# Patient Record
Sex: FEMALE | Race: WHITE | NOT HISPANIC OR LATINO | Employment: FULL TIME | URBAN - METROPOLITAN AREA
[De-identification: names, ages, dates, MRNs, and addresses within clinical notes are randomized per-mention and may not be internally consistent; named-entity substitution may affect disease eponyms.]

---

## 2016-11-17 LAB
EXTERNAL ANTIBODY SCREEN: NEGATIVE
EXTERNAL CHLAMYDIA SCREEN: NEGATIVE
EXTERNAL GROUP B STREP ANTIGEN: NEGATIVE
EXTERNAL HEMATOCRIT: 41 %
EXTERNAL HEMOGLOBIN: 13.7 G/DL
EXTERNAL HEPATITIS B SURFACE ANTIGEN: NEGATIVE
EXTERNAL RUBELLA QUALITATIVE: NORMAL
EXTERNAL SYPHILIS RPR SCREEN: NORMAL
EXTERNAL SYPHILIS RPR SCREEN: NORMAL

## 2017-01-04 ENCOUNTER — OFFICE VISIT (OUTPATIENT)
Dept: OBSTETRICS AND GYNECOLOGY | Facility: HOSPITAL | Age: 37
End: 2017-01-04

## 2017-01-04 ENCOUNTER — HOSPITAL ENCOUNTER (OUTPATIENT)
Dept: WOMENS IMAGING | Facility: HOSPITAL | Age: 37
Discharge: HOME | End: 2017-01-04
Attending: OBSTETRICS & GYNECOLOGY | Admitting: OBSTETRICS & GYNECOLOGY

## 2017-01-04 VITALS — BODY MASS INDEX: 30.23 KG/M2 | DIASTOLIC BLOOD PRESSURE: 70 MMHG | WEIGHT: 193 LBS | SYSTOLIC BLOOD PRESSURE: 123 MMHG

## 2017-01-04 DIAGNOSIS — O09.522 AMA (ADVANCED MATERNAL AGE) MULTIGRAVIDA 35+, SECOND TRIMESTER: Primary | ICD-10-CM

## 2017-01-04 DIAGNOSIS — O35.BXX0: ICD-10-CM

## 2017-01-04 DIAGNOSIS — O09.812 PREGNANCY RESULTING FROM IN VITRO FERTILIZATION IN SECOND TRIMESTER: ICD-10-CM

## 2017-01-04 PROBLEM — O09.529 AMA (ADVANCED MATERNAL AGE) MULTIGRAVIDA 35+: Status: ACTIVE | Noted: 2017-01-04

## 2017-01-04 PROCEDURE — 93325 DOPPLER ECHO COLOR FLOW MAPG: CPT | Performed by: OBSTETRICS & GYNECOLOGY

## 2017-01-04 PROCEDURE — 93325 DOPPLER ECHO COLOR FLOW MAPG: CPT

## 2017-01-04 PROCEDURE — 76811 OB US DETAILED SNGL FETUS: CPT

## 2017-01-04 PROCEDURE — 76825 ECHO EXAM OF FETAL HEART: CPT | Performed by: OBSTETRICS & GYNECOLOGY

## 2017-01-04 PROCEDURE — 76811 OB US DETAILED SNGL FETUS: CPT | Performed by: OBSTETRICS & GYNECOLOGY

## 2017-01-04 PROCEDURE — 76827 ECHO EXAM OF FETAL HEART: CPT

## 2017-01-04 PROCEDURE — 76825 ECHO EXAM OF FETAL HEART: CPT

## 2017-01-04 RX ORDER — PROMETHAZINE HYDROCHLORIDE 25 MG/1
12.5 TABLET ORAL EVERY 6 HOURS PRN
COMMUNITY
End: 2017-04-24 | Stop reason: HOSPADM

## 2017-01-04 RX ORDER — PRENATAL WITH FERROUS FUM AND FOLIC ACID 3080; 920; 120; 400; 22; 1.84; 3; 20; 10; 1; 12; 200; 27; 25; 2 [IU]/1; [IU]/1; MG/1; [IU]/1; MG/1; MG/1; MG/1; MG/1; MG/1; MG/1; UG/1; MG/1; MG/1; MG/1; MG/1
1 TABLET ORAL DAILY
COMMUNITY
End: 2018-10-22

## 2017-01-04 RX ORDER — PSEUDOEPHEDRINE HCL 30 MG
30 TABLET ORAL EVERY 4 HOURS PRN
COMMUNITY
End: 2017-04-24 | Stop reason: HOSPADM

## 2017-01-04 NOTE — MR AVS SNAPSHOT
Jennifer Cosme   2017 12:15 PM   Office Visit    Dept Phone:  [phone]   Encounter #:  [id number]    Provider:  RIK PIERCE DEPT SCHEDULE   Department:  [facility]                Your Full Care Plan              Your Updated Medication List          This list is accurate as of: 17  2:00 PM.  Always use your most recent med list.                amoxicillin 875 MG tablet   Commonly known as:  AMOXIL   Take 1 tablet by mouth 2 (Two) Times a Day.       omeprazole 20 MG capsule   Commonly known as:  priLOSEC       Prenatal 27-1 27-1 MG tablet tablet       promethazine 25 MG tablet   Commonly known as:  PHENERGAN       pseudoephedrine 30 MG tablet   Commonly known as:  SUDAFED               Instructions     None    Patient Instructions History      Upcoming Appointments     Visit Type Date Time Department    [facility] 2017 12[facility]    US [facility] PDC 2017 12:30 PM RIK JEAN US PER DIAG CTR      MyChart Signup     [facility] Tail allows you to send messages to your doctor, view your test results, renew your prescriptions, schedule appointments, and more. To sign up, go to [employer] and click on the Sign Up Now link in the New User? box. Enter your Tail Activation Code exactly as it appears below along with the last four digits of your Social Security Number and your Date of Birth () to complete the sign-up process. If you do not sign up before the expiration date, you must request a new code.    Tail Activation Code: [email or web address]  Expires: 2017  2:00 PM    If you have questions, you can email [email or web address] or call [phone] to talk to our Tail staff. Remember, Tail is NOT to be used for urgent needs. For medical emergencies, dial 911.               Other Info from Your Visit           Allergies     No Known Allergies      Reason for Visit     Pregnancy Problem fetal echo, IVF  pregnancy, prev c/s x 1      Vital Signs     Blood Pressure Weight Last Menstrual Period Body Mass Index Smoking Status       123/70 193 lb (87.5 kg) 07/22/2016 30.23 kg/m2 Never Smoker

## 2017-03-29 ENCOUNTER — LAB REQUISITION (OUTPATIENT)
Dept: LAB | Facility: HOSPITAL | Age: 37
End: 2017-03-29

## 2017-03-29 DIAGNOSIS — Z34.83 ENCOUNTER FOR SUPERVISION OF OTHER NORMAL PREGNANCY, THIRD TRIMESTER: ICD-10-CM

## 2017-03-29 PROCEDURE — 87081 CULTURE SCREEN ONLY: CPT | Performed by: OBSTETRICS & GYNECOLOGY

## 2017-04-01 LAB — BACTERIA SPEC AEROBE CULT: NORMAL

## 2017-04-19 DIAGNOSIS — Z3A.39 39 WEEKS GESTATION OF PREGNANCY: Primary | ICD-10-CM

## 2017-04-19 RX ORDER — CEFAZOLIN SODIUM 2 G/100ML
2 INJECTION, SOLUTION INTRAVENOUS ONCE
Status: CANCELLED | OUTPATIENT
Start: 2017-04-19 | End: 2017-04-19

## 2017-04-19 RX ORDER — SODIUM CHLORIDE 0.9 % (FLUSH) 0.9 %
1-10 SYRINGE (ML) INJECTION AS NEEDED
Status: CANCELLED | OUTPATIENT
Start: 2017-04-19

## 2017-04-20 ENCOUNTER — APPOINTMENT (OUTPATIENT)
Dept: PREADMISSION TESTING | Facility: HOSPITAL | Age: 37
End: 2017-04-20

## 2017-04-20 VITALS — WEIGHT: 203.8 LBS | HEIGHT: 67 IN | BODY MASS INDEX: 31.99 KG/M2

## 2017-04-20 LAB
ABO GROUP BLD: NORMAL
ALP SERPL-CCNC: 194 U/L (ref 25–100)
ALT SERPL W P-5'-P-CCNC: 18 U/L (ref 7–40)
ANTI-D, PASSIVE: NORMAL
AST SERPL-CCNC: 19 U/L (ref 0–33)
BILIRUB SERPL-MCNC: 0.4 MG/DL (ref 0.3–1.2)
BLD GP AB SCN SERPL QL: POSITIVE
CREAT BLD-MCNC: 0.5 MG/DL (ref 0.6–1.3)
DEPRECATED RDW RBC AUTO: 43.8 FL (ref 37–54)
ERYTHROCYTE [DISTWIDTH] IN BLOOD BY AUTOMATED COUNT: 13.9 % (ref 11.3–14.5)
EXTERNAL ABO GROUPING: NORMAL
EXTERNAL ANTIBODY SCREEN: POSITIVE
EXTERNAL RH FACTOR: NEGATIVE
HCT VFR BLD AUTO: 34.8 % (ref 34.5–44)
HGB BLD-MCNC: 11.1 G/DL (ref 11.5–15.5)
LDH SERPL-CCNC: 173 U/L (ref 120–246)
MCH RBC QN AUTO: 27.5 PG (ref 27–31)
MCHC RBC AUTO-ENTMCNC: 31.9 G/DL (ref 32–36)
MCV RBC AUTO: 86.1 FL (ref 80–99)
PLATELET # BLD AUTO: 200 10*3/MM3 (ref 150–450)
PMV BLD AUTO: 12 FL (ref 6–12)
RBC # BLD AUTO: 4.04 10*6/MM3 (ref 3.89–5.14)
RH BLD: NEGATIVE
URATE SERPL-MCNC: 3.9 MG/DL (ref 3.1–7.8)
WBC NRBC COR # BLD: 8.76 10*3/MM3 (ref 3.5–10.8)

## 2017-04-20 RX ORDER — LANSOPRAZOLE 30 MG/1
30 CAPSULE, DELAYED RELEASE ORAL DAILY
COMMUNITY
End: 2017-04-24 | Stop reason: HOSPADM

## 2017-04-20 NOTE — DISCHARGE INSTRUCTIONS
What to know before your arrive:     -Do not eat, drink or chew gum after midnight the day before your procedure.    This also includes mints.   -Do not shave any part of your body including abdomen or pelvic are for two    days before your procedure.   -If you are taking a scheduled medication (insulin, blood pressure medicine,   antibiotics) please consult with your physician whether to take on the day of   surgery.   -Remove all jewelry including rings, wedding bands, and piercing before coming   to the hospital.   -Leave important valuables at home.   -Do not wear dark fingernail polish.   -Bring the following with you to the hospital:    -Picture ID and insurance, Medicare or Medicaid cards    -Co-pay/deductible required by insurance (Cash, Check, Credit Card)    -Copy of living will or power  document (if applicable)    -CPAP mask and tubing, not machine (if applicable)    -Skin prep instructions sheet    What to know the day of procedure:     -Park in the [facility], take elevator for first floor, exit to the right and  proceed through the doors to outside, follow the covered sidewalk to the  entrance of the [place], follow the hallway and signs to the [facility],  enter the [place] to your right BEFORE entering the 1720 lobby.  Take the  elevators to the 3rd floor ([address] [place]).   -Leave unnecessary items in your vehicle, including your suitcase.  Your support  person or a family member can get it for you after your procedure.   -Check in at the reception desk in the lobby of the 3rd floor ([address] [place]).   -One person may accompany you to the pre-op/recovery area.  Please have  other family members wait in the waiting room.   -An anesthesiologist will meet with your prior to your procedure.   -After anesthesia has been initiated, one person may accompany you in the  operating room.   -No video cameras are permitted in the operating room; only still cameras,  Please.      What to  expect while you are in recovery:     -One person may stay with you while you are in recovery.   -If the baby is stable, he/she may visit to initiate breastfeeding & [facility].    THE FOLLOWING INFORMATION WAS PROVIDED TO PATIENT:     SECTION BOOKLET BY DONNA  PAIN MANAGEMENT   RESPIREX    CHLORHEXIDINE GLUCONATE WIPES AND INSTRUCTIONS GIVEN TO PATIENT

## 2017-04-21 ENCOUNTER — HOSPITAL ENCOUNTER (INPATIENT)
Facility: HOSPITAL | Age: 37
LOS: 3 days | Discharge: HOME | End: 2017-04-24
Attending: OBSTETRICS & GYNECOLOGY | Admitting: OBSTETRICS & GYNECOLOGY

## 2017-04-21 ENCOUNTER — ANESTHESIA (OUTPATIENT)
Dept: LABOR AND DELIVERY | Facility: HOSPITAL | Age: 37
End: 2017-04-21

## 2017-04-21 ENCOUNTER — ANESTHESIA EVENT (OUTPATIENT)
Dept: LABOR AND DELIVERY | Facility: HOSPITAL | Age: 37
End: 2017-04-21

## 2017-04-21 DIAGNOSIS — Z3A.39 39 WEEKS GESTATION OF PREGNANCY: ICD-10-CM

## 2017-04-21 PROBLEM — Z98.891 STATUS POST REPEAT LOW TRANSVERSE CESAREAN SECTION: Status: ACTIVE | Noted: 2017-04-21

## 2017-04-21 PROBLEM — Z34.90 PREGNANCY: Status: RESOLVED | Noted: 2017-04-21 | Resolved: 2017-04-21

## 2017-04-21 PROBLEM — Z34.90 PREGNANCY: Status: ACTIVE | Noted: 2017-04-21

## 2017-04-21 LAB
ABO GROUP BLD: NORMAL
BASOPHILS # BLD MANUAL: 0 10*3/MM3 (ref 0–0.2)
BASOPHILS NFR BLD AUTO: 0 % (ref 0–1)
DEPRECATED RDW RBC AUTO: 44.8 FL (ref 37–54)
EOSINOPHIL # BLD MANUAL: 0 10*3/MM3 (ref 0.1–0.3)
EOSINOPHIL NFR BLD MANUAL: 0 % (ref 0–3)
ERYTHROCYTE [DISTWIDTH] IN BLOOD BY AUTOMATED COUNT: 14.1 % (ref 11.3–14.5)
FETAL BLEED: POSITIVE
HCT VFR BLD AUTO: 33.1 % (ref 34.5–44)
HGB BLD-MCNC: 10.4 G/DL (ref 11.5–15.5)
HGB F BLD QL KLEIH BETKE: NORMAL
LYMPHOCYTES # BLD MANUAL: 0.9 10*3/MM3 (ref 0.6–4.8)
LYMPHOCYTES NFR BLD MANUAL: 4 % (ref 0–12)
LYMPHOCYTES NFR BLD MANUAL: 6 % (ref 24–44)
MCH RBC QN AUTO: 27.6 PG (ref 27–31)
MCHC RBC AUTO-ENTMCNC: 31.4 G/DL (ref 32–36)
MCV RBC AUTO: 87.8 FL (ref 80–99)
MONOCYTES # BLD AUTO: 0.6 10*3/MM3 (ref 0–1)
NEUTROPHILS # BLD AUTO: 13.51 10*3/MM3 (ref 1.5–8.3)
NEUTROPHILS NFR BLD MANUAL: 87 % (ref 41–71)
NEUTS BAND NFR BLD MANUAL: 3 % (ref 0–5)
PLAT MORPH BLD: NORMAL
PLATELET # BLD AUTO: 188 10*3/MM3 (ref 150–450)
PMV BLD AUTO: 11.2 FL (ref 6–12)
RBC # BLD AUTO: 3.77 10*6/MM3 (ref 3.89–5.14)
RBC MORPH BLD: NORMAL
RH BLD: NEGATIVE
WBC MORPH BLD: NORMAL
WBC NRBC COR # BLD: 15.01 10*3/MM3 (ref 3.5–10.8)

## 2017-04-21 PROCEDURE — 59025 FETAL NON-STRESS TEST: CPT

## 2017-04-21 PROCEDURE — 25010000002 FENTANYL CITRATE (PF) 100 MCG/2ML SOLUTION: Performed by: NURSE ANESTHETIST, CERTIFIED REGISTERED

## 2017-04-21 PROCEDURE — 25010000002 PHENYLEPHRINE PER 1 ML: Performed by: NURSE ANESTHETIST, CERTIFIED REGISTERED

## 2017-04-21 PROCEDURE — C1765 ADHESION BARRIER: HCPCS | Performed by: OBSTETRICS & GYNECOLOGY

## 2017-04-21 PROCEDURE — 25010000003 CEFAZOLIN IN DEXTROSE 2-4 GM/100ML-% SOLUTION: Performed by: NURSE PRACTITIONER

## 2017-04-21 PROCEDURE — 25010000002 ONDANSETRON PER 1 MG: Performed by: NURSE ANESTHETIST, CERTIFIED REGISTERED

## 2017-04-21 PROCEDURE — 25010000003 MORPHINE PER 10 MG: Performed by: NURSE ANESTHETIST, CERTIFIED REGISTERED

## 2017-04-21 PROCEDURE — 86901 BLOOD TYPING SEROLOGIC RH(D): CPT | Performed by: OBSTETRICS & GYNECOLOGY

## 2017-04-21 PROCEDURE — 85461 HEMOGLOBIN FETAL: CPT | Performed by: OBSTETRICS & GYNECOLOGY

## 2017-04-21 PROCEDURE — 85460 HEMOGLOBIN FETAL: CPT | Performed by: OBSTETRICS & GYNECOLOGY

## 2017-04-21 PROCEDURE — 25010000002 MIDAZOLAM PER 1 MG: Performed by: NURSE ANESTHETIST, CERTIFIED REGISTERED

## 2017-04-21 PROCEDURE — 86900 BLOOD TYPING SEROLOGIC ABO: CPT | Performed by: OBSTETRICS & GYNECOLOGY

## 2017-04-21 PROCEDURE — 85027 COMPLETE CBC AUTOMATED: CPT | Performed by: OBSTETRICS & GYNECOLOGY

## 2017-04-21 PROCEDURE — 85007 BL SMEAR W/DIFF WBC COUNT: CPT | Performed by: OBSTETRICS & GYNECOLOGY

## 2017-04-21 RX ORDER — FAMOTIDINE 10 MG/ML
INJECTION, SOLUTION INTRAVENOUS AS NEEDED
Status: DISCONTINUED | OUTPATIENT
Start: 2017-04-21 | End: 2017-04-21 | Stop reason: SURG

## 2017-04-21 RX ORDER — SODIUM CHLORIDE 0.9 % (FLUSH) 0.9 %
1-10 SYRINGE (ML) INJECTION AS NEEDED
Status: DISCONTINUED | OUTPATIENT
Start: 2017-04-21 | End: 2017-04-24 | Stop reason: HOSPADM

## 2017-04-21 RX ORDER — SODIUM CHLORIDE 0.9 % (FLUSH) 0.9 %
1-10 SYRINGE (ML) INJECTION AS NEEDED
Status: DISCONTINUED | OUTPATIENT
Start: 2017-04-21 | End: 2017-04-21 | Stop reason: HOSPADM

## 2017-04-21 RX ORDER — OXYTOCIN 10 [USP'U]/ML
INJECTION, SOLUTION INTRAMUSCULAR; INTRAVENOUS AS NEEDED
Status: DISCONTINUED | OUTPATIENT
Start: 2017-04-21 | End: 2017-04-21 | Stop reason: SURG

## 2017-04-21 RX ORDER — PROMETHAZINE HYDROCHLORIDE 25 MG/ML
12.5 INJECTION, SOLUTION INTRAMUSCULAR; INTRAVENOUS EVERY 6 HOURS PRN
Status: DISCONTINUED | OUTPATIENT
Start: 2017-04-21 | End: 2017-04-24 | Stop reason: HOSPADM

## 2017-04-21 RX ORDER — DIPHENHYDRAMINE HYDROCHLORIDE 50 MG/ML
25 INJECTION INTRAMUSCULAR; INTRAVENOUS EVERY 4 HOURS PRN
Status: DISCONTINUED | OUTPATIENT
Start: 2017-04-21 | End: 2017-04-22

## 2017-04-21 RX ORDER — SODIUM CHLORIDE, SODIUM LACTATE, POTASSIUM CHLORIDE, CALCIUM CHLORIDE 600; 310; 30; 20 MG/100ML; MG/100ML; MG/100ML; MG/100ML
125 INJECTION, SOLUTION INTRAVENOUS CONTINUOUS
Status: DISCONTINUED | OUTPATIENT
Start: 2017-04-21 | End: 2017-04-21 | Stop reason: SDUPTHER

## 2017-04-21 RX ORDER — SODIUM CHLORIDE, SODIUM LACTATE, POTASSIUM CHLORIDE, CALCIUM CHLORIDE 600; 310; 30; 20 MG/100ML; MG/100ML; MG/100ML; MG/100ML
125 INJECTION, SOLUTION INTRAVENOUS CONTINUOUS
Status: DISCONTINUED | OUTPATIENT
Start: 2017-04-21 | End: 2017-04-24 | Stop reason: HOSPADM

## 2017-04-21 RX ORDER — FENTANYL CITRATE 50 UG/ML
INJECTION, SOLUTION INTRAMUSCULAR; INTRAVENOUS AS NEEDED
Status: DISCONTINUED | OUTPATIENT
Start: 2017-04-21 | End: 2017-04-21 | Stop reason: SURG

## 2017-04-21 RX ORDER — DOCUSATE SODIUM 100 MG/1
100 CAPSULE, LIQUID FILLED ORAL 2 TIMES DAILY PRN
Status: DISCONTINUED | OUTPATIENT
Start: 2017-04-21 | End: 2017-04-24 | Stop reason: HOSPADM

## 2017-04-21 RX ORDER — ONDANSETRON 2 MG/ML
INJECTION INTRAMUSCULAR; INTRAVENOUS AS NEEDED
Status: DISCONTINUED | OUTPATIENT
Start: 2017-04-21 | End: 2017-04-21 | Stop reason: SURG

## 2017-04-21 RX ORDER — OXYCODONE HYDROCHLORIDE AND ACETAMINOPHEN 5; 325 MG/1; MG/1
1 TABLET ORAL EVERY 4 HOURS PRN
Status: DISCONTINUED | OUTPATIENT
Start: 2017-04-21 | End: 2017-04-24 | Stop reason: HOSPADM

## 2017-04-21 RX ORDER — MORPHINE SULFATE 4 MG/ML
2 INJECTION, SOLUTION INTRAMUSCULAR; INTRAVENOUS
Status: CANCELLED | OUTPATIENT
Start: 2017-04-21 | End: 2017-04-21

## 2017-04-21 RX ORDER — CEFAZOLIN SODIUM 2 G/100ML
2 INJECTION, SOLUTION INTRAVENOUS ONCE
Status: COMPLETED | OUTPATIENT
Start: 2017-04-21 | End: 2017-04-21

## 2017-04-21 RX ORDER — DIPHENHYDRAMINE HCL 25 MG
25 CAPSULE ORAL EVERY 4 HOURS PRN
Status: DISCONTINUED | OUTPATIENT
Start: 2017-04-21 | End: 2017-04-22

## 2017-04-21 RX ORDER — SIMETHICONE 80 MG
80 TABLET,CHEWABLE ORAL
Status: DISCONTINUED | OUTPATIENT
Start: 2017-04-21 | End: 2017-04-24 | Stop reason: HOSPADM

## 2017-04-21 RX ORDER — IBUPROFEN 600 MG/1
600 TABLET ORAL EVERY 6 HOURS PRN
Status: DISCONTINUED | OUTPATIENT
Start: 2017-04-21 | End: 2017-04-24 | Stop reason: HOSPADM

## 2017-04-21 RX ORDER — LANOLIN 100 %
OINTMENT (GRAM) TOPICAL
Status: DISCONTINUED | OUTPATIENT
Start: 2017-04-21 | End: 2017-04-24 | Stop reason: HOSPADM

## 2017-04-21 RX ORDER — PROMETHAZINE HYDROCHLORIDE 12.5 MG/1
12.5 SUPPOSITORY RECTAL EVERY 6 HOURS PRN
Status: DISCONTINUED | OUTPATIENT
Start: 2017-04-21 | End: 2017-04-24 | Stop reason: HOSPADM

## 2017-04-21 RX ORDER — ONDANSETRON 2 MG/ML
4 INJECTION INTRAMUSCULAR; INTRAVENOUS ONCE AS NEEDED
Status: DISCONTINUED | OUTPATIENT
Start: 2017-04-21 | End: 2017-04-22

## 2017-04-21 RX ORDER — NALOXONE HCL 0.4 MG/ML
0.4 VIAL (ML) INJECTION
Status: ACTIVE | OUTPATIENT
Start: 2017-04-21 | End: 2017-04-22

## 2017-04-21 RX ORDER — MORPHINE SULFATE 2 MG/ML
2 INJECTION, SOLUTION INTRAMUSCULAR; INTRAVENOUS
Status: DISCONTINUED | OUTPATIENT
Start: 2017-04-21 | End: 2017-04-22

## 2017-04-21 RX ORDER — HYDROCODONE BITARTRATE AND ACETAMINOPHEN 5; 325 MG/1; MG/1
1 TABLET ORAL EVERY 4 HOURS PRN
Status: DISCONTINUED | OUTPATIENT
Start: 2017-04-21 | End: 2017-04-24 | Stop reason: HOSPADM

## 2017-04-21 RX ORDER — MIDAZOLAM HYDROCHLORIDE 1 MG/ML
INJECTION INTRAMUSCULAR; INTRAVENOUS AS NEEDED
Status: DISCONTINUED | OUTPATIENT
Start: 2017-04-21 | End: 2017-04-21 | Stop reason: SURG

## 2017-04-21 RX ORDER — BUPIVACAINE HYDROCHLORIDE 7.5 MG/ML
INJECTION, SOLUTION EPIDURAL; RETROBULBAR AS NEEDED
Status: DISCONTINUED | OUTPATIENT
Start: 2017-04-21 | End: 2017-04-21 | Stop reason: SURG

## 2017-04-21 RX ORDER — MORPHINE SULFATE 0.5 MG/ML
INJECTION, SOLUTION EPIDURAL; INTRATHECAL; INTRAVENOUS AS NEEDED
Status: DISCONTINUED | OUTPATIENT
Start: 2017-04-21 | End: 2017-04-21 | Stop reason: SURG

## 2017-04-21 RX ORDER — OXYTOCIN/RINGER'S LACTATE 20/1000 ML
2 PLASTIC BAG, INJECTION (ML) INTRAVENOUS CONTINUOUS
Status: DISCONTINUED | OUTPATIENT
Start: 2017-04-21 | End: 2017-04-24 | Stop reason: HOSPADM

## 2017-04-21 RX ORDER — PROMETHAZINE HYDROCHLORIDE 25 MG/1
25 TABLET ORAL EVERY 6 HOURS PRN
Status: DISCONTINUED | OUTPATIENT
Start: 2017-04-21 | End: 2017-04-24 | Stop reason: HOSPADM

## 2017-04-21 RX ORDER — PANTOPRAZOLE SODIUM 40 MG/1
40 TABLET, DELAYED RELEASE ORAL
Status: DISCONTINUED | OUTPATIENT
Start: 2017-04-22 | End: 2017-04-24 | Stop reason: HOSPADM

## 2017-04-21 RX ADMIN — SODIUM CHLORIDE, POTASSIUM CHLORIDE, SODIUM LACTATE AND CALCIUM CHLORIDE 1000 ML: 600; 310; 30; 20 INJECTION, SOLUTION INTRAVENOUS at 07:10

## 2017-04-21 RX ADMIN — SODIUM CHLORIDE, POTASSIUM CHLORIDE, SODIUM LACTATE AND CALCIUM CHLORIDE: 600; 310; 30; 20 INJECTION, SOLUTION INTRAVENOUS at 08:05

## 2017-04-21 RX ADMIN — BUPIVACAINE HYDROCHLORIDE 1.5 ML: 7.5 INJECTION, SOLUTION EPIDURAL; RETROBULBAR at 07:59

## 2017-04-21 RX ADMIN — FENTANYL CITRATE 15 MCG: 50 INJECTION, SOLUTION INTRAMUSCULAR; INTRAVENOUS at 07:59

## 2017-04-21 RX ADMIN — MORPHINE SULFATE 4.85 MG: 0.5 INJECTION, SOLUTION EPIDURAL; INTRATHECAL; INTRAVENOUS at 08:56

## 2017-04-21 RX ADMIN — MORPHINE SULFATE 0.15 MG: 0.5 INJECTION, SOLUTION EPIDURAL; INTRATHECAL; INTRAVENOUS at 07:59

## 2017-04-21 RX ADMIN — PHENYLEPHRINE HYDROCHLORIDE 100 MCG: 10 INJECTION INTRAVENOUS at 08:06

## 2017-04-21 RX ADMIN — CEFAZOLIN SODIUM 2 G: 2 INJECTION, SOLUTION INTRAVENOUS at 07:26

## 2017-04-21 RX ADMIN — SODIUM CHLORIDE, POTASSIUM CHLORIDE, SODIUM LACTATE AND CALCIUM CHLORIDE: 600; 310; 30; 20 INJECTION, SOLUTION INTRAVENOUS at 08:28

## 2017-04-21 RX ADMIN — OXYCODONE AND ACETAMINOPHEN 1 TABLET: 5; 325 TABLET ORAL at 16:10

## 2017-04-21 RX ADMIN — PHENYLEPHRINE HYDROCHLORIDE 100 MCG: 10 INJECTION INTRAVENOUS at 08:37

## 2017-04-21 RX ADMIN — IBUPROFEN 600 MG: 600 TABLET ORAL at 23:04

## 2017-04-21 RX ADMIN — SIMETHICONE CHEW TAB 80 MG 80 MG: 80 TABLET ORAL at 21:29

## 2017-04-21 RX ADMIN — IBUPROFEN 600 MG: 600 TABLET ORAL at 16:10

## 2017-04-21 RX ADMIN — OXYCODONE AND ACETAMINOPHEN 1 TABLET: 5; 325 TABLET ORAL at 11:48

## 2017-04-21 RX ADMIN — MIDAZOLAM HYDROCHLORIDE 1 MG: 1 INJECTION, SOLUTION INTRAMUSCULAR; INTRAVENOUS at 07:53

## 2017-04-21 RX ADMIN — SODIUM CITRATE AND CITRIC ACID MONOHYDRATE 30 ML: 500; 334 SOLUTION ORAL at 07:25

## 2017-04-21 RX ADMIN — OXYTOCIN 30 UNITS: 10 INJECTION, SOLUTION INTRAMUSCULAR; INTRAVENOUS at 08:28

## 2017-04-21 RX ADMIN — SODIUM CHLORIDE, POTASSIUM CHLORIDE, SODIUM LACTATE AND CALCIUM CHLORIDE 1000 ML/HR: 600; 310; 30; 20 INJECTION, SOLUTION INTRAVENOUS at 07:15

## 2017-04-21 RX ADMIN — PANTOPRAZOLE SODIUM 40 MG: 40 TABLET, DELAYED RELEASE ORAL at 21:30

## 2017-04-21 RX ADMIN — SODIUM CHLORIDE, POTASSIUM CHLORIDE, SODIUM LACTATE AND CALCIUM CHLORIDE 1000 ML: 600; 310; 30; 20 INJECTION, SOLUTION INTRAVENOUS at 07:15

## 2017-04-21 RX ADMIN — OXYCODONE AND ACETAMINOPHEN 1 TABLET: 5; 325 TABLET ORAL at 20:14

## 2017-04-21 RX ADMIN — ONDANSETRON 4 MG: 2 INJECTION INTRAMUSCULAR; INTRAVENOUS at 08:37

## 2017-04-21 RX ADMIN — FAMOTIDINE 20 MG: 10 INJECTION, SOLUTION INTRAVENOUS at 08:37

## 2017-04-21 NOTE — PLAN OF CARE
Problem: Patient Care Overview (Adult)  Goal: Plan of Care Review  Outcome: Ongoing (interventions implemented as appropriate)    04/21/17 1220   Coping/Psychosocial Response Interventions   Plan Of Care Reviewed With patient   Patient Care Overview   Progress no change   Outcome Evaluation   Outcome Summary/Follow up Plan Patient indicates breastfeeding going well. She is using a nipple shield due to flat nipples. She will wear soft shells between feedings to help keyon nipples. She will continue to feed infant on demand.

## 2017-04-21 NOTE — PLAN OF CARE
Problem: Patient Care Overview (Adult)  Goal: Plan of Care Review  Outcome: Ongoing (interventions implemented as appropriate)    17 1002   Coping/Psychosocial Response Interventions   Plan Of Care Reviewed With patient;spouse   Patient Care Overview   Progress no change       Goal: Adult Individualization and Mutuality  Outcome: Ongoing (interventions implemented as appropriate)    17 1002   Individualization   Patient Specific Goals Breastfeeding       Goal: Discharge Needs Assessment  Outcome: Ongoing (interventions implemented as appropriate)    17 1002   Discharge Needs Assessment   Concerns To Be Addressed no discharge needs identified   Readmission Within The Last 30 Days no previous admission in last 30 days   Discharge Disposition home or self-care   Current Health   Anticipated Changes Related to Illness none   Self-Care   Equipment Currently Used at Home none   Living Environment   Transportation Available car         Problem:  Delivery (Adult,Obstetrics,Pediatric)  Goal: Signs and Symptoms of Listed Potential Problems Will be Absent or Manageable ( Delivery)  Outcome: Outcome(s) achieved Date Met:  17 1002    Delivery   Problems Assessed ( Delivery) all   Problems Present ( Delivery) none

## 2017-04-21 NOTE — ANESTHESIA POSTPROCEDURE EVALUATION
Patient: Jennifer Cosme    Procedure Summary     Date Anesthesia Start Anesthesia Stop Room / Location    17 0752 0920 [facility] TED LABOR DELIVERY 1 /  TED LABOR DELIVERY       Procedure Diagnosis Surgeon Provider     SECTION REPEAT (N/A Abdomen) No diagnosis on file. MD Asia Santacruz DO          Anesthesia Type: spinal, ITN  Last vitals  /88 (17 1000)    Temp 97.8 °F (36.6 °C) (17 0920)    Pulse 75 (17 1005)   Resp 16 (17 1005)    SpO2 99 % (17 1005)      Post Anesthesia Care and Evaluation    Patient location during evaluation: bedside  Patient participation: complete - patient participated  Level of consciousness: awake and alert  Pain score: 0  Pain management: adequate  Airway patency: patent  Anesthetic complications: No anesthetic complications    Cardiovascular status: acceptable  Respiratory status: acceptable  Hydration status: acceptable  Post Neuraxial Block status: No signs or symptoms of PDPH

## 2017-04-21 NOTE — LACTATION NOTE
04/21/17 1220   Maternal Infant Assessment   Size Issue, Bilateral Breasts no   Shape, Bilateral Breasts round   Density, Bilateral Breasts soft   Nipples, Bilateral flat   Nipple Conditions, Bilateral intact   Equipment Type/Education   Breast Pump Type double electric, personal   Additional Equipment breast shields;breast shells

## 2017-04-21 NOTE — INTERVAL H&P NOTE
H&P created for the current admission was reviewed. The patient was examined and there are no changes to the H&P.    +IUP @ 39+0 for repeat LTUI C/S; No tubal ligation  Rh neg; f/u BBT  Male; desires circ  Breastfeeding    Saroj Henson MD  April 21, 2017      Agree with above.

## 2017-04-21 NOTE — OP NOTE
DATE OF PROCEDURE:  2017    PREOPERATIVE DIAGNOSES:  1.  Intrauterine pregnancy at term.    2.  Prior  section.    3.  Desires repeat.      POSTOPERATIVE DIAGNOSES:  1.  Intrauterine pregnancy at term.    2.  Prior  section.    3.  Desires repeat.    4.  Breech presentation.     PROCEDURE PERFORMED: Repeat  section, low transverse uterine incision.     SURGEON: Anya Yanez MD      ASSISTANT: Saroj Henson MD      ESTIMATED BLOOD LOSS: 1000 mL.     FINDINGS: A viable male infant with Apgar scores of 8 at one minute and 9 at five minutes, weighing 8 pounds 5 ounces.     COMPLICATIONS: None.     INDICATIONS FOR PROCEDURE: The patient is a 37-year-old G2, P1 at 39 weeks with a history of IVF pregnancy who presents for elective repeat  section. She understood all potential risks and complications and had no questions preoperatively.     DESCRIPTION OF PROCEDURE: The patient was taken to the operating room where adequate anesthesia was obtained. She was prepped and draped in the usual sterile fashion. A Pfannenstiel incision was made across her old  scar and carried to the anterior rectus fascia. The fascia was incised and carried laterally with curved Camp scissors. Underlying muscle was  with sharp and blunt dissection. Midline was identified, entered sharply, and taken superiorly and inferiorly with care to avoid injury to the bladder dome. A bladder blade was placed at the lower uterine segment which appeared to be underdeveloped. On palpation, it was felt the baby was breech. The bladder flap was again developed, and then incision made in the lower uterine segment and extended laterally. The bottom was somewhat difficult to bring through the hysterotomy at first. Additional room was made both anteriorly and incisionally, and then the breech was brought through without difficulty. The legs were flexed and the arms delivered followed by the head without  difficulty. The baby was suctioned on the abdomen; 30 seconds was allowed before clamping the cord and then cord blood was obtained for cord blood banking. The placenta was manually removed. The uterus required significant manipulation to get through the incision, but once it was through, the uterus was curetted, and the incision was closed with #1 chromic in a running, locking fashion from angle to angle meeting in the midline. The superior portion of this incision was much thicker than a very thin lower inferior edge and only 1-layered closure could be performed safely. Sterile milk was placed in the bladder to be sure we were away from the bladder edge as it was close to the hysterotomy. At this point, the uterus was inspected. Any bleeding points were made hemostatic. Any bleeding points for manipulation of the uterus to get out of the abdomen were made hemostatic and the uterus was replaced in the abdomen. The lateral gutters were irrigated and aspirated. The incision was reinspected and the peritoneum was closed with 2-0 Vicryl. The muscle was reapproximated in the midline. Any bleeding points were made hemostatic. The fascia was closed with 0 Vicryl in a running fashion x2 meeting in the midline. The subcutaneous tissue was irrigated and aspirated. It was undermined to allow tension-free closure, and reapproximated with 3-0 plain gut suture and the skin was closed with staples. The patient went to the recovery room awake and stable.       MD ADELSO Lawler/jen  DD: 04/21/2017 09:33:06  DT: 04/21/2017 10:45:39  Voice Rec. ID #[id number]  Voice Original ID #[id number]  Doc ID #[id number]  Rev. #1  cc:

## 2017-04-21 NOTE — ANESTHESIA PREPROCEDURE EVALUATION
Anesthesia Evaluation     Patient summary reviewed and Nursing notes reviewed   no history of anesthetic complications:  NPO Status: > 8 hours   Airway   Mallampati: II  TM distance: >3 FB  Neck ROM: full  no difficulty expected  Dental      Pulmonary    Cardiovascular - negative cardio ROS        Neuro/Psych- negative ROS  GI/Hepatic/Renal/Endo    (+)  GERD well controlled,     Musculoskeletal (-) negative ROS    Abdominal    Substance History - negative use     OB/GYN    (+) Pregnant,         Other - negative ROS                                   Anesthesia Plan    ASA 2     spinal and ITN     Anesthetic plan and risks discussed with patient.

## 2017-04-21 NOTE — ANESTHESIA PROCEDURE NOTES
Spinal Block    Indication:procedure for pain  Performed By  Anesthesiologist: WU KERR  CRNA: JAZMIN IRWIN  Preanesthetic Checklist  Completed: patient identified, surgical consent, pre-op evaluation, timeout performed, IV checked, risks and benefits discussed and monitors and equipment checked  Spinal Block Prep:  Patient Position:sitting  Sterile Tech:cap, gloves, mask and sterile barriers  Prep:Betadine  Patient Monitoring:blood pressure monitoring, continuous pulse oximetry and EKG  Spinal Block Procedure  Approach:midline  Guidance:palpation technique  Location:L4-L5  Needle Type:Lore  Needle Gauge:25 G  Paresthesia: no  Fluid Appearance:clear  Post Assessment  Patient Tolerance:patient tolerated the procedure well with no apparent complications  Complications no

## 2017-04-22 LAB
BASOPHILS # BLD AUTO: 0.02 10*3/MM3 (ref 0–0.2)
BASOPHILS NFR BLD AUTO: 0.2 % (ref 0–1)
DEPRECATED RDW RBC AUTO: 44.6 FL (ref 37–54)
EOSINOPHIL # BLD AUTO: 0.03 10*3/MM3 (ref 0.1–0.3)
EOSINOPHIL NFR BLD AUTO: 0.3 % (ref 0–3)
ERYTHROCYTE [DISTWIDTH] IN BLOOD BY AUTOMATED COUNT: 14.1 % (ref 11.3–14.5)
HCT VFR BLD AUTO: 30 % (ref 34.5–44)
HGB BLD-MCNC: 9.6 G/DL (ref 11.5–15.5)
IMM GRANULOCYTES # BLD: 0.03 10*3/MM3 (ref 0–0.03)
IMM GRANULOCYTES NFR BLD: 0.3 % (ref 0–0.6)
LYMPHOCYTES # BLD AUTO: 0.71 10*3/MM3 (ref 0.6–4.8)
LYMPHOCYTES NFR BLD AUTO: 7 % (ref 24–44)
MCH RBC QN AUTO: 27.7 PG (ref 27–31)
MCHC RBC AUTO-ENTMCNC: 32 G/DL (ref 32–36)
MCV RBC AUTO: 86.7 FL (ref 80–99)
MONOCYTES # BLD AUTO: 0.78 10*3/MM3 (ref 0–1)
MONOCYTES NFR BLD AUTO: 7.7 % (ref 0–12)
NEUTROPHILS # BLD AUTO: 8.62 10*3/MM3 (ref 1.5–8.3)
NEUTROPHILS NFR BLD AUTO: 84.5 % (ref 41–71)
NUMBER OF DOSES: NORMAL
PLATELET # BLD AUTO: 165 10*3/MM3 (ref 150–450)
PMV BLD AUTO: 11.7 FL (ref 6–12)
RBC # BLD AUTO: 3.46 10*6/MM3 (ref 3.89–5.14)
WBC NRBC COR # BLD: 10.19 10*3/MM3 (ref 3.5–10.8)

## 2017-04-22 PROCEDURE — 85025 COMPLETE CBC W/AUTO DIFF WBC: CPT | Performed by: OBSTETRICS & GYNECOLOGY

## 2017-04-22 PROCEDURE — 25010000002 RHO D IMMUNE GLOBULIN 1500 UNIT/2ML SOLUTION PREFILLED SYRINGE: Performed by: OBSTETRICS & GYNECOLOGY

## 2017-04-22 RX ADMIN — IBUPROFEN 600 MG: 600 TABLET ORAL at 04:57

## 2017-04-22 RX ADMIN — OXYCODONE AND ACETAMINOPHEN 1 TABLET: 5; 325 TABLET ORAL at 12:41

## 2017-04-22 RX ADMIN — OXYCODONE AND ACETAMINOPHEN 1 TABLET: 5; 325 TABLET ORAL at 04:57

## 2017-04-22 RX ADMIN — HUMAN RHO(D) IMMUNE GLOBULIN 300 MCG: 1500 SOLUTION INTRAMUSCULAR; INTRAVENOUS at 10:56

## 2017-04-22 RX ADMIN — SIMETHICONE CHEW TAB 80 MG 80 MG: 80 TABLET ORAL at 08:34

## 2017-04-22 RX ADMIN — SIMETHICONE CHEW TAB 80 MG 80 MG: 80 TABLET ORAL at 21:10

## 2017-04-22 RX ADMIN — OXYCODONE AND ACETAMINOPHEN 1 TABLET: 5; 325 TABLET ORAL at 00:59

## 2017-04-22 RX ADMIN — SIMETHICONE CHEW TAB 80 MG 80 MG: 80 TABLET ORAL at 12:41

## 2017-04-22 RX ADMIN — IBUPROFEN 600 MG: 600 TABLET ORAL at 22:35

## 2017-04-22 RX ADMIN — OXYCODONE AND ACETAMINOPHEN 1 TABLET: 5; 325 TABLET ORAL at 08:53

## 2017-04-22 RX ADMIN — OXYCODONE AND ACETAMINOPHEN 1 TABLET: 5; 325 TABLET ORAL at 21:10

## 2017-04-22 RX ADMIN — DOCUSATE SODIUM 100 MG: 100 CAPSULE, LIQUID FILLED ORAL at 08:34

## 2017-04-22 RX ADMIN — IBUPROFEN 600 MG: 600 TABLET ORAL at 16:54

## 2017-04-22 RX ADMIN — DOCUSATE SODIUM 100 MG: 100 CAPSULE, LIQUID FILLED ORAL at 16:48

## 2017-04-22 RX ADMIN — IBUPROFEN 600 MG: 600 TABLET ORAL at 10:56

## 2017-04-22 RX ADMIN — OXYCODONE AND ACETAMINOPHEN 1 TABLET: 5; 325 TABLET ORAL at 16:47

## 2017-04-22 RX ADMIN — PANTOPRAZOLE SODIUM 40 MG: 40 TABLET, DELAYED RELEASE ORAL at 21:10

## 2017-04-22 NOTE — PROGRESS NOTES
2017    Name:Jennifer Cosme    MR#:[id number]     PROGRESS NOTE:  Post-Op 1 S/P    HD:1    Subjective   37 y.o. yo Female  s/p CS at 39w0d doing well. Pain well controlled. Tolerating regular diet and having flatus. Lochia normal.     Patient Active Problem List   Diagnosis   • AMA (advanced maternal age) multigravida 35+   • Pregnancy resulting from in vitro fertilization in second trimester   • Status post repeat low transverse  section        Objective    Vitals  Temp:  Temp:  [97.5 °F (36.4 °C)-98.8 °F (37.1 °C)] 97.9 °F (36.6 °C)  Temp src: Oral  BP:  BP: (108-138)/(74-94) 127/75  Pulse:  Heart Rate:  [] 93  RR:   Resp:  [16-18] 16    General Awake, alert, no distress  Abdomen Soft, non-distended, fundus firm, below umbilicus, appropriately tender  Incision  Intact, no erythema or exudate  Extremities Calves NT bilaterally     I/O last 3 completed shifts:  In: 2000 [I.V.:2000]  Out: 4850 [Urine:3850; Blood:1000]    LABS:   Lab Results   Component Value Date    WBC 10.19 2017    HGB 9.6 (L) 2017    HCT 30.0 (L) 2017    MCV 86.7 2017     2017       Infant: male       Assessment   1.  POD 1    Plan: Doing well.  Routine postoperative care      Active Problems:   None      Mitzi Magdaleno, APRN  2017 8:54 AM

## 2017-04-22 NOTE — LACTATION NOTE
"This note was copied from a baby's chart.     04/22/17 1545   Maternal Information   Date of Referral 04/22/17   Person Making Referral nurse   Maternal Reason for Referral breastfeeding currently   Maternal Infant Assessment   Size Issue, Bilateral Breasts no   Shape, Bilateral Breasts round   Nipples, Bilateral graspable   Nipple Conditions, Bilateral intact   Infant Assessment   Sucking Reflex present   Rooting Reflex present   Swallow Reflex present   Maternal Infant Feeding   Maternal Emotional State anxious;independent   Previous Breastfeeding History yes   Infant Positioning clutch/\"football\"   Signs of Milk Transfer audible swallow;infant jaw motion present   Presence of Pain no   Nipple Shape After Feeding, Left Breast round   Nipple Shape After Feeding, Right Breast pinched   Feeding Infant   Satiety Cues calm after feeding;cessation of sucking;infant releases breast   Effective Latch During Feeding yes   Skin-to-Skin Contact During Feeding yes   Equipment Type/Education   Breast Pump Type double electric, personal   Additional Equipment breast shells     "

## 2017-04-22 NOTE — PLAN OF CARE
Problem: Patient Care Overview (Adult)  Goal: Plan of Care Review  Outcome: Ongoing (interventions implemented as appropriate)    04/22/17 0517 04/22/17 1545   Coping/Psychosocial Response Interventions   Plan Of Care Reviewed With patient;spouse --    Patient Care Overview   Progress improving --    Outcome Evaluation   Outcome Summary/Follow up Plan --  Patient states infant sleepy at breast and unable to latch him without a shield, infant able to latch on both sides without a shield with a good suck and swallow pattern. Told mom to continue to wear soft shells and offer breast at least every 3 hours or sooner. Told patient and spouse to avoid pacifier for now.

## 2017-04-22 NOTE — PLAN OF CARE
Problem: Patient Care Overview (Adult)  Goal: Plan of Care Review  Outcome: Ongoing (interventions implemented as appropriate)    17 0517   Coping/Psychosocial Response Interventions   Plan Of Care Reviewed With patient;spouse   Patient Care Overview   Progress improving       Goal: Adult Individualization and Mutuality  Outcome: Ongoing (interventions implemented as appropriate)    17 1002   Individualization   Patient Specific Goals Breastfeeding       Goal: Discharge Needs Assessment  Outcome: Ongoing (interventions implemented as appropriate)    Problem: Postpartum ( Delivery) (Adult)  Goal: Signs and Symptoms of Listed Potential Problems Will be Absent or Manageable (Postpartum)  Outcome: Ongoing (interventions implemented as appropriate)    17 0517   Postpartum ( Delivery)   Problems Assessed (Postpartum ) all   Problems Present (Postpartum ) none         Problem: Breastfeeding (Adult,NICU,,Obstetrics,Pediatric)  Goal: Signs and Symptoms of Listed Potential Problems Will be Absent or Manageable (Breastfeeding)  Outcome: Ongoing (interventions implemented as appropriate)    17 0517   Breastfeeding   Problems Assessed (Breastfeeding) all   Problems Present (Breastfeeding) none

## 2017-04-23 RX ADMIN — IBUPROFEN 600 MG: 600 TABLET ORAL at 05:35

## 2017-04-23 RX ADMIN — DOCUSATE SODIUM 100 MG: 100 CAPSULE, LIQUID FILLED ORAL at 08:15

## 2017-04-23 RX ADMIN — DOCUSATE SODIUM 100 MG: 100 CAPSULE, LIQUID FILLED ORAL at 17:53

## 2017-04-23 RX ADMIN — SIMETHICONE CHEW TAB 80 MG 80 MG: 80 TABLET ORAL at 22:08

## 2017-04-23 RX ADMIN — SIMETHICONE CHEW TAB 80 MG 80 MG: 80 TABLET ORAL at 17:53

## 2017-04-23 RX ADMIN — OXYCODONE AND ACETAMINOPHEN 1 TABLET: 5; 325 TABLET ORAL at 01:24

## 2017-04-23 RX ADMIN — OXYCODONE AND ACETAMINOPHEN 1 TABLET: 5; 325 TABLET ORAL at 22:09

## 2017-04-23 RX ADMIN — IBUPROFEN 600 MG: 600 TABLET ORAL at 17:53

## 2017-04-23 RX ADMIN — OXYCODONE AND ACETAMINOPHEN 1 TABLET: 5; 325 TABLET ORAL at 09:50

## 2017-04-23 RX ADMIN — SIMETHICONE CHEW TAB 80 MG 80 MG: 80 TABLET ORAL at 11:14

## 2017-04-23 RX ADMIN — OXYCODONE AND ACETAMINOPHEN 1 TABLET: 5; 325 TABLET ORAL at 17:53

## 2017-04-23 RX ADMIN — OXYCODONE AND ACETAMINOPHEN 1 TABLET: 5; 325 TABLET ORAL at 05:35

## 2017-04-23 RX ADMIN — PANTOPRAZOLE SODIUM 40 MG: 40 TABLET, DELAYED RELEASE ORAL at 22:08

## 2017-04-23 RX ADMIN — SIMETHICONE CHEW TAB 80 MG 80 MG: 80 TABLET ORAL at 08:15

## 2017-04-23 RX ADMIN — OXYCODONE AND ACETAMINOPHEN 1 TABLET: 5; 325 TABLET ORAL at 13:54

## 2017-04-23 RX ADMIN — IBUPROFEN 600 MG: 600 TABLET ORAL at 11:14

## 2017-04-23 NOTE — DISCHARGE SUMMARY
2017    Name:Jennifer Cosme    MR#:[id number]     PROGRESS NOTE:  Post-Op day 2 S/P    HD:2    Subjective   37 y.o. yo Female  s/p CS at 39w0d doing well. Pain well controlled. Tolerating regular diet and having flatus. Lochia normal.     Patient Active Problem List   Diagnosis   • AMA (advanced maternal age) multigravida 35+   • Pregnancy resulting from in vitro fertilization in second trimester   • Status post repeat low transverse  section        Objective    Vitals  Temp:  Temp:  [97.9 °F (36.6 °C)-98.5 °F (36.9 °C)] 98.3 °F (36.8 °C)  Temp src: Oral  BP:  BP: (127-142)/(70-84) 132/72  Pulse:  Heart Rate:  [] 102  RR:   Resp:  [16-18] 16    General Awake, alert, no distress  Abdomen Soft, non-distended, fundus firm, below umbilicus, appropriately tender  Incision  Intact, no erythema or exudate  Extremities Calves NT bilaterally     I/O last 3 completed shifts:  In: -   Out: 6150 [Urine:6150]    LABS:   Lab Results   Component Value Date    WBC 10.19 2017    HGB 9.6 (L) 2017    HCT 30.0 (L) 2017    MCV 86.7 2017     2017       Infant: male       Assessment   1.  POD day 2    Plan: Doing well.  Routine postoperative care      Active Problems:   None      Lavon Garza MD  2017 12:56 PM

## 2017-04-24 VITALS
BODY MASS INDEX: 31.86 KG/M2 | DIASTOLIC BLOOD PRESSURE: 71 MMHG | RESPIRATION RATE: 18 BRPM | WEIGHT: 203 LBS | OXYGEN SATURATION: 99 % | SYSTOLIC BLOOD PRESSURE: 127 MMHG | TEMPERATURE: 98.4 F | HEART RATE: 91 BPM | HEIGHT: 67 IN

## 2017-04-24 PROBLEM — O09.812 PREGNANCY RESULTING FROM IN VITRO FERTILIZATION IN SECOND TRIMESTER: Status: RESOLVED | Noted: 2017-01-04 | Resolved: 2017-04-24

## 2017-04-24 PROBLEM — Z98.891 STATUS POST REPEAT LOW TRANSVERSE CESAREAN SECTION: Status: RESOLVED | Noted: 2017-04-21 | Resolved: 2017-04-24

## 2017-04-24 PROBLEM — O09.529 AMA (ADVANCED MATERNAL AGE) MULTIGRAVIDA 35+: Status: RESOLVED | Noted: 2017-01-04 | Resolved: 2017-04-24

## 2017-04-24 RX ORDER — IBUPROFEN 600 MG/1
600 TABLET ORAL EVERY 6 HOURS PRN
Qty: 30 TABLET | Refills: 2 | Status: SHIPPED | OUTPATIENT
Start: 2017-04-24 | End: 2022-11-17 | Stop reason: SDUPTHER

## 2017-04-24 RX ORDER — OXYCODONE HYDROCHLORIDE AND ACETAMINOPHEN 5; 325 MG/1; MG/1
1-2 TABLET ORAL EVERY 6 HOURS PRN
Qty: 24 TABLET | Refills: 0 | Status: SHIPPED | OUTPATIENT
Start: 2017-04-24 | End: 2017-04-27

## 2017-04-24 RX ORDER — PSEUDOEPHEDRINE HCL 30 MG
100 TABLET ORAL 2 TIMES DAILY PRN
Refills: 0
Start: 2017-04-24 | End: 2018-02-05

## 2017-04-24 RX ADMIN — OXYCODONE AND ACETAMINOPHEN 1 TABLET: 5; 325 TABLET ORAL at 09:56

## 2017-04-24 RX ADMIN — IBUPROFEN 600 MG: 600 TABLET ORAL at 00:42

## 2017-04-24 RX ADMIN — IBUPROFEN 600 MG: 600 TABLET ORAL at 07:52

## 2017-04-24 RX ADMIN — SIMETHICONE CHEW TAB 80 MG 80 MG: 80 TABLET ORAL at 07:52

## 2017-04-24 RX ADMIN — OXYCODONE AND ACETAMINOPHEN 1 TABLET: 5; 325 TABLET ORAL at 02:01

## 2017-04-24 RX ADMIN — OXYCODONE AND ACETAMINOPHEN 1 TABLET: 5; 325 TABLET ORAL at 05:27

## 2017-04-24 RX ADMIN — DOCUSATE SODIUM 100 MG: 100 CAPSULE, LIQUID FILLED ORAL at 07:52

## 2017-04-24 NOTE — PLAN OF CARE
Problem: Patient Care Overview (Adult)  Goal: Plan of Care Review  Outcome: Ongoing (interventions implemented as appropriate)    04/24/17 0512   Coping/Psychosocial Response Interventions   Plan Of Care Reviewed With patient   Patient Care Overview   Progress improving

## 2017-04-24 NOTE — DISCHARGE SUMMARY
Discharge Summary    Date of Admission: 2017  Date of Discharge:  2017      Patient: Jennifer Cosme      MR#:[id number]    Primary Surgeon/OB: Anya Yanez MD    Discharge Surgeon/OB: Dr Yanez    Presenting Problem/History of Present Illness  Pregnancy [Z33.1]     Patient Active Problem List   Diagnosis   (none) - all problems resolved or deleted         Discharge Diagnosis:  section at 39w0d    Procedures:  , Low Transverse     2017    8:26 AM        Rh Immune globulin given: yes, MBT B negative.    Discharge Date: 2017; Discharge Time: 8:36 AM    Early Discharge:  NO    Hospital Course  Patient is a 37 y.o. female  at 39w0d status post  section with uneventful postoperative recovery.  Patient was advanced to regular diet on postoperative day#1.  On discharge, ambulating, tolerating a regular diet without any difficulties and her incision is dry, clean and intact, NAD, no s/s PP depression. Rhogam given.     Infant:   male  fetus 8 lb 5 oz (3.77 kg)  with Apgar scores of 8  , 9   at five minutes.    Condition on Discharge:  Stable    Vital Signs  Temp:  [97.9 °F (36.6 °C)-98.4 °F (36.9 °C)] 98.4 °F (36.9 °C)  Heart Rate:  [86-96] 91  Resp:  [16-18] 18  BP: (127-134)/(71-86) 127/71    Lab Results   Component Value Date    WBC 10.19 2017    HGB 9.6 (L) 2017    HCT 30.0 (L) 2017    MCV 86.7 2017     2017       Discharge Disposition  Home or Self Care    Discharge Medications   Jennifer Cosme   Home Medication Instructions [id number]    Printed on:17 0835   Medication Information                      docusate sodium 100 MG capsule  Take 100 mg by mouth 2 (Two) Times a Day As Needed for Constipation.             ibuprofen (ADVIL,MOTRIN) 600 MG tablet  Take 1 tablet by mouth Every 6 (Six) Hours As Needed for Mild Pain (1-3).             oxyCODONE-acetaminophen (PERCOCET) 5-325 MG per tablet  Take 1-2 tablets by mouth  Every 6 (Six) Hours As Needed for Severe Pain (7-10) for up to 3 days.             Prenatal Vit-Fe Fumarate-FA (PRENATAL 27-1) 27-1 MG tablet tablet  Take 1 tablet by mouth Daily.                 Discharge Diet: Regular    Activity at Discharge:   Activity Instructions     Discharge Activity Restrictions       1) No driving for 2 weeks from delivery and no longer taking narcotics.   2) Do not lift / push / pull more then 10 lbs.       Pelvic Rest       For 6 weeks                  Follow-up Appointments  No future appointments.  Additional Instructions for the Follow-ups that You Need to Schedule     Call MD for problems / concerns.    As directed    Please call with concerns of depression.       Discharge Follow-up with Specified Provider    As directed    To:  Dr Yanez   Follow Up:  2 Weeks       Follow-Up    As directed    with primary care obstetrician     Follow Up Details:  in 2 weeks from delivery with RENEE Jones  04/24/17  8:35 AM  Csd

## 2017-04-24 NOTE — PROGRESS NOTES
RIK Carmichael  Jennifer Cosme  : 1980  MRN: [id number]  CSN: [id number]    Post-operative Day #3  Subjective   Patient not in room at time of rounds.     Objective     Min/max vitals past 24 hours:   Temp  Min: 97.9 °F (36.6 °C)  Max: 98.4 °F (36.9 °C)  BP  Min: 132/79  Max: 134/86  Pulse  Min: 86  Max: 102  Resp  Min: 16  Max: 16          Results from last 7 days  Lab Units 17  0747 17  1554 17  1107   WBC 10*3/mm3 10.19 15.01* 8.76   HEMOGLOBIN g/dL 9.6* 10.4* 11.1*   HEMATOCRIT % 30.0* 33.1* 34.8   PLATELETS 10*3/mm3 165 188 200     Lab Results   Component Value Date    RH Negative 2017    HEPBSAG Negative 2016        Assessment   1. POD #3 S/P Repeat  (LTCS)     Plan   1. Continue routine postpartum care  2. Continue routine post-operative care  3. Ambulate  4. Remove staples and place steristrips  5. Discharge to home  6. Advised no tampons or intercourse for 6 weeks.  7. D/C questions all answered  8. Follow-up appointment in 2 week(s) for incision check; 6 weeks for postpartum visit.    Saroj Henson MD  2017  7:46 AM

## 2017-04-24 NOTE — PLAN OF CARE
Problem: Patient Care Overview (Adult)  Goal: Discharge Needs Assessment  Outcome: Outcome(s) achieved Date Met:  17    Problem: Postpartum ( Delivery) (Adult)  Goal: Signs and Symptoms of Listed Potential Problems Will be Absent or Manageable (Postpartum)  Outcome: Outcome(s) achieved Date Met:  17    Problem: Breastfeeding (Adult,NICU,[place],Obstetrics,Pediatric)  Goal: Signs and Symptoms of Listed Potential Problems Will be Absent or Manageable (Breastfeeding)  Outcome: Outcome(s) achieved Date Met:  17

## 2017-04-24 NOTE — LACTATION NOTE
Mom reports baby is sleepier today and having a hard time waking baby to feed.  Waking techniques given to babies.  Mom encouraged to pump if baby skips feeding or does not empty breasts.

## 2017-04-24 NOTE — PLAN OF CARE
Problem: Patient Care Overview (Adult)  Goal: Plan of Care Review  Outcome: Outcome(s) achieved Date Met:  04/24/17 04/24/17 1024   Coping/Psychosocial Response Interventions   Plan Of Care Reviewed With patient   Patient Care Overview   Progress improving       Goal: Adult Individualization and Mutuality  Outcome: Outcome(s) achieved Date Met:  04/24/17

## 2017-05-01 ENCOUNTER — HOSPITAL ENCOUNTER (OUTPATIENT)
Dept: LACTATION | Facility: HOSPITAL | Age: 37
Discharge: HOME | End: 2017-05-01

## 2018-02-05 ENCOUNTER — OFFICE VISIT (OUTPATIENT)
Dept: RETAIL CLINIC | Facility: CLINIC | Age: 38
End: 2018-02-05

## 2018-02-05 VITALS
BODY MASS INDEX: 26.68 KG/M2 | WEIGHT: 170 LBS | RESPIRATION RATE: 20 BRPM | HEART RATE: 98 BPM | HEIGHT: 67 IN | TEMPERATURE: 97.7 F | OXYGEN SATURATION: 98 %

## 2018-02-05 DIAGNOSIS — R68.89 FLU-LIKE SYMPTOMS: Primary | ICD-10-CM

## 2018-02-05 DIAGNOSIS — J10.1 INFLUENZA A: ICD-10-CM

## 2018-02-05 LAB
EXPIRATION DATE: ABNORMAL
FLUAV AG NPH QL: POSITIVE
FLUBV AG NPH QL: NEGATIVE
INTERNAL CONTROL: ABNORMAL
Lab: ABNORMAL

## 2018-02-05 PROCEDURE — 99213 OFFICE O/P EST LOW 20 MIN: CPT | Performed by: NURSE PRACTITIONER

## 2018-02-05 PROCEDURE — 87804 INFLUENZA ASSAY W/OPTIC: CPT | Performed by: NURSE PRACTITIONER

## 2018-02-05 RX ORDER — ONDANSETRON 4 MG/1
4 TABLET, ORALLY DISINTEGRATING ORAL EVERY 8 HOURS PRN
Qty: 6 TABLET | Refills: 0 | Status: SHIPPED | OUTPATIENT
Start: 2018-02-05 | End: 2018-02-07

## 2018-02-05 NOTE — PATIENT INSTRUCTIONS
"Influenza, Adult  Influenza (“the flu\") is an infection in the lungs, nose, and throat (respiratory tract). It is caused by a virus. The flu causes many common cold symptoms, as well as a high fever and body aches. It can make you feel very sick.  The flu spreads easily from person to person (is contagious). Getting a flu shot (influenza vaccination) every year is the best way to prevent the flu.  Follow these instructions at home:  · Take over-the-counter and prescription medicines only as told by your doctor.  · Use a cool mist humidifier to add moisture (humidity) to the air in your home. This can make it easier to breathe.  · Rest as needed.  · Drink enough fluid to keep your pee (urine) clear or pale yellow.  · Cover your mouth and nose when you cough or sneeze.  · Wash your hands with soap and water often, especially after you cough or sneeze. If you cannot use soap and water, use hand .  · Stay home from work or school as told by your doctor. Unless you are visiting your doctor, try to avoid leaving home until your fever has been gone for 24 hours without the use of medicine.  · Keep all follow-up visits as told by your doctor. This is important.  How is this prevented?  · Getting a yearly (annual) flu shot is the best way to avoid getting the flu. You may get the flu shot in late summer, fall, or winter. Ask your doctor when you should get your flu shot.  · Wash your hands often or use hand  often.  · Avoid contact with people who are sick during cold and flu season.  · Eat healthy foods.  · Drink plenty of fluids.  · Get enough sleep.  · Exercise regularly.  Contact a doctor if:  · You get new symptoms.  · You have:  ¨ Chest pain.  ¨ Watery poop (diarrhea).  ¨ A fever.  · Your cough gets worse.  · You start to have more mucus.  · You feel sick to your stomach (nauseous).  · You throw up (vomit).  Get help right away if:  · You start to be short of breath or have trouble breathing.  · Your " skin or nails turn a bluish color.  · You have very bad pain or stiffness in your neck.  · You get a sudden headache.  · You get sudden pain in your face or ear.  · You cannot stop throwing up.  This information is not intended to replace advice given to you by your health care provider. Make sure you discuss any questions you have with your health care provider.  Document Released: 09/26/2009 Document Revised: 05/25/2017 Document Reviewed: 10/11/2016  [employer] Interactive Patient Education © 2017 [employer]    Medications and plan of care reviewed with patient and teaching done on medication reactions/side effects.  Call ped dr about medication since you are breast feeding. Take medication as prescribed, do not take over the counter medications except as discussed, or drink alcohol while on medication.  Monitor for drowsiness with medication, do not drive if drowsiness occurs.  Rest, plenty of fluids, comfort measures, humidifier, warm salt water gargles, saline spray and follow up with PCP if symptoms persist.  If worse go to urgent care or ER as discussed.

## 2018-02-05 NOTE — PROGRESS NOTES
Subjective   Jennifer Cosme is a 37 y.o. female presents with flu like symptoms.  State had the flu shot this season but has been exposed to the flu at work.    Flu Symptoms   This is a new problem. Episode onset: saturday. The problem has been gradually worsening. Associated symptoms include chills, congestion, coughing (mild dry cough), fatigue, a fever (max temp 101 today), headaches, myalgias, nausea and vomiting (vomited x 1 on sat none since). Pertinent negatives include no abdominal pain, chest pain, neck pain, numbness, rash, sore throat, urinary symptoms or visual change. Nothing aggravates the symptoms. She has tried NSAIDs and acetaminophen for the symptoms. The treatment provided mild relief.        The following portions of the patient's history were reviewed and updated as appropriate: allergies, current medications, past family history, past medical history, past social history and past surgical history.    Review of Systems   Constitutional: Positive for appetite change (decrease appetite, drinking fluids), chills, fatigue and fever (max temp 101 today).   HENT: Positive for congestion, postnasal drip and rhinorrhea. Negative for ear discharge, ear pain, sinus pain, sinus pressure, sneezing, sore throat, trouble swallowing and voice change.    Eyes: Negative.    Respiratory: Positive for cough (mild dry cough). Negative for chest tightness, shortness of breath, wheezing and stridor.    Cardiovascular: Negative.  Negative for chest pain.   Gastrointestinal: Positive for nausea and vomiting (vomited x 1 on sat none since). Negative for abdominal pain.   Genitourinary: Negative.    Musculoskeletal: Positive for myalgias. Negative for neck pain.   Skin: Negative for rash.   Neurological: Positive for headaches. Negative for dizziness, syncope, light-headedness and numbness.       Objective   Physical Exam   Constitutional: She is oriented to person, place, and time. She appears well-developed and  well-nourished. No distress.   HENT:   Head: Normocephalic and atraumatic.   Right Ear: A middle ear effusion is present.   Left Ear: A middle ear effusion is present.   Nose: Mucosal edema present. No sinus tenderness.   Mouth/Throat: Uvula is midline and mucous membranes are normal. Posterior oropharyngeal erythema (mild erythema with PND) present. Tonsils are 0 on the right. Tonsils are 0 on the left. No tonsillar exudate.   Eyes: Conjunctivae and lids are normal. Pupils are equal, round, and reactive to light.   Neck: Normal range of motion.   Cardiovascular: Normal rate, regular rhythm and normal heart sounds.    No murmur heard.  Pulmonary/Chest: Effort normal and breath sounds normal. No respiratory distress.   Abdominal: Soft. Normal appearance and bowel sounds are normal. She exhibits no distension. There is no tenderness.   Lymphadenopathy:     She has no cervical adenopathy.   Neurological: She is alert and oriented to person, place, and time.   Skin: Skin is warm and dry.   Psychiatric: She has a normal mood and affect. Her speech is normal and behavior is normal.       Assessment/Plan   Jennifer was seen today for flu symptoms.    Diagnoses and all orders for this visit:    Flu-like symptoms  -     POC Influenza A / B  Results for orders placed or performed in visit on 02/05/18   POC Influenza A / B   Result Value Ref Range    Rapid Influenza A Ag positive     Rapid Influenza B Ag negative     Internal Control Passed Passed    Lot Number [id number]     Expiration Date 12/2019          Influenza A  -     ondansetron ODT (ZOFRAN ODT) 4 MG disintegrating tablet; Take 1 tablet by mouth Every 8 (Eight) Hours As Needed for Nausea or Vomiting for up to 2 days.      Flu positive, discussed with patient treatment options, declined Tamiflu, but requested Zofran, cautions reviewed about taking Zofran while breastfeeding, patient states wants the med but will discuss with ped dr prior to taking.  Medications and plan of  care reviewed with patient and teaching done on medication reactions/side effects.  Call ped dr about medication since you are breast feeding. Take medication as prescribed, do not take over the counter medications except as discussed, or drink alcohol while on medication.  Monitor for drowsiness with medication, do not drive if drowsiness occurs.  Rest, plenty of fluids, comfort measures, humidifier, warm salt water gargles, saline spray and follow up with PCP if symptoms persist.  If worse go to urgent care or ER as discussed.  Patient verbalized understanding.    RENEE Baugh

## 2018-02-26 ENCOUNTER — OFFICE VISIT (OUTPATIENT)
Dept: RETAIL CLINIC | Facility: CLINIC | Age: 38
End: 2018-02-26

## 2018-02-26 VITALS
HEIGHT: 67 IN | DIASTOLIC BLOOD PRESSURE: 62 MMHG | BODY MASS INDEX: 25.58 KG/M2 | WEIGHT: 163 LBS | SYSTOLIC BLOOD PRESSURE: 104 MMHG | TEMPERATURE: 99 F | OXYGEN SATURATION: 99 % | RESPIRATION RATE: 18 BRPM | HEART RATE: 98 BPM

## 2018-02-26 DIAGNOSIS — H66.42 SUPPURATIVE OTITIS MEDIA OF LEFT EAR WITHOUT RUPTURE OF TYMPANIC MEMBRANE: Primary | ICD-10-CM

## 2018-02-26 PROCEDURE — 99213 OFFICE O/P EST LOW 20 MIN: CPT | Performed by: NURSE PRACTITIONER

## 2018-02-26 RX ORDER — METHYLPREDNISOLONE 4 MG/1
TABLET ORAL
Qty: 1 EACH | Refills: 0 | Status: SHIPPED | OUTPATIENT
Start: 2018-02-26 | End: 2018-10-22

## 2018-02-26 RX ORDER — CEFDINIR 300 MG/1
300 CAPSULE ORAL 2 TIMES DAILY
Qty: 20 CAPSULE | Refills: 0 | Status: SHIPPED | OUTPATIENT
Start: 2018-02-26 | End: 2018-10-22

## 2018-02-26 RX ORDER — FLUCONAZOLE 150 MG/1
TABLET ORAL
Qty: 2 TABLET | Refills: 0 | Status: SHIPPED | OUTPATIENT
Start: 2018-02-26 | End: 2018-10-22

## 2018-02-26 NOTE — PATIENT INSTRUCTIONS

## 2018-02-26 NOTE — PROGRESS NOTES
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 Patient diagnosed with fluA on 18. She presented to Urgent Care on 18 with left ear pain, given course Amoxil 875mg. Symptoms did not improve and was given Azithromycin and Medrol by PCP. She completed medications on 18, however has worsening left ear pain. At this time, Jennifer presents for further evaluation of left ear pain.      Earache    There is pain in the left ear. This is a recurrent problem. Episode onset: worse last 3 days. The problem occurs constantly. The problem has been gradually worsening. There has been no fever. Associated symptoms include coughing and rhinorrhea. Pertinent negatives include no diarrhea, ear discharge, headaches, hearing loss, neck pain, rash, sore throat or vomiting. She has tried antibiotics for the symptoms. The treatment provided mild relief. There is no history of a chronic ear infection or hearing loss.        History Obtained from: Patient    Past Medical History:   Diagnosis Date   • Female infertility     IVF pregnancy (2nd)   • GERD (gastroesophageal reflux disease)     worse during pregancy    • Infertility, female    • Nausea and vomiting during pregnancy    • Polycystic ovary syndrome     per labs   • Rh incompatibility     took Rhogam at 28 weeks      Past Surgical History:   Procedure Laterality Date   •  SECTION N/A 2017    Procedure:  SECTION REPEAT;  Surgeon: Anya Yanez MD;  Location: [facility] LABOR DELIVERY;  Service:    •  SECTION PRIMARY     • EAR TUBES     • EYE MUSCLE SURGERY       Social History     Social History   • Marital status:      Spouse name: N/A   • Number of children: N/A   • Years of education: N/A     Occupational History   • Not on file.     Social History Main Topics   • Smoking status: Never Smoker   • Smokeless tobacco: Never Used   • Alcohol use No   • Drug use: No   • Sexual  activity: Yes     Birth control/ protection: OCP     Other Topics Concern   • Not on file     Social History Narrative     No family history on file.  No Known Allergies  Current Outpatient Prescriptions   Medication Sig Dispense Refill   • ibuprofen (ADVIL,MOTRIN) 600 MG tablet Take 1 tablet by mouth Every 6 (Six) Hours As Needed for Mild Pain (1-3). 30 tablet 2   • Norethindrone (FAIZAN PO) Take  by mouth.     • cefdinir (OMNICEF) 300 MG capsule Take 1 capsule by mouth 2 (Two) Times a Day. 20 capsule 0   • fluconazole (DIFLUCAN) 150 MG tablet Take 1 tablet by mouth, may repeat in 72 hours 2 tablet 0   • MethylPREDNISolone (MEDROL, TIARA,) 4 MG tablet Take as directed on package instructions. 1 each 0   • Prenatal Vit-Fe Fumarate-FA (PRENATAL 27-1) 27-1 MG tablet tablet Take 1 tablet by mouth Daily.       No current facility-administered medications for this visit.         The following portions of the patient's history were reviewed and updated as appropriate: allergies, current medications, past family history, past medical history, past social history and past surgical history.    Review of Systems   Constitutional: Negative for chills, fever and unexpected weight change.   HENT: Positive for congestion, ear pain, postnasal drip, rhinorrhea and sinus pressure. Negative for ear discharge, hearing loss, sore throat and voice change.    Eyes: Negative.    Respiratory: Positive for cough. Negative for shortness of breath and wheezing.    Cardiovascular: Negative.    Gastrointestinal: Positive for nausea (mild). Negative for diarrhea and vomiting.   Musculoskeletal: Negative for neck pain and neck stiffness.   Skin: Negative for rash and wound.   Neurological: Negative for dizziness, light-headedness and headaches.   Hematological: Negative for adenopathy.   Psychiatric/Behavioral: Negative.        Objective     VITAL SIGNS:   Vitals:    02/26/18 2016   BP: 104/62   Pulse: 98   Resp: 18   Temp: 99 °F (37.2 °C)  "  [facility]: Temporal Artery    SpO2: 99%   Weight: 73.9 kg (163 lb)   Height: 170.2 cm (67\")   Body mass index is 25.53 kg/(m^2).    Physical Exam   Constitutional: She appears well-developed and well-nourished. She is cooperative.  Non-toxic appearance. No distress.   HENT:   Head: Normocephalic and atraumatic.   Right Ear: Hearing, tympanic membrane and ear canal normal.   Left Ear: External ear and ear canal normal. Tympanic membrane is erythematous and bulging. Tympanic membrane is not perforated.   Nose: Mucosal edema and rhinorrhea present. No nasal deformity. Right sinus exhibits no maxillary sinus tenderness and no frontal sinus tenderness. Left sinus exhibits no maxillary sinus tenderness and no frontal sinus tenderness.   Mouth/Throat: Uvula is midline and mucous membranes are normal. Posterior oropharyngeal erythema (mild) present. No tonsillar exudate.   Eyes: Conjunctivae, EOM and lids are normal. Pupils are equal, round, and reactive to light. No scleral icterus.   Neck: Normal range of motion and phonation normal. Neck supple. No rigidity. No tracheal deviation present.   Cardiovascular: Normal rate and regular rhythm.    No murmur heard.  Pulmonary/Chest: Effort normal and breath sounds normal.   Occasional cough appreciated   Musculoskeletal: She exhibits no deformity.   Lymphadenopathy:     She has no cervical adenopathy.        Right: No supraclavicular adenopathy present.        Left: No supraclavicular adenopathy present.   Neurological: She is alert. Coordination and gait normal.   Skin: Skin is warm and dry. No rash noted. No cyanosis.   Psychiatric: Her behavior is normal. She is attentive.       LABS:   Results for orders placed or performed in visit on 02/05/18   POC Influenza A / B   Result Value Ref Range    Rapid Influenza A Ag positive     Rapid Influenza B Ag negative     Internal Control Passed Passed    Lot Number [id number]     Expiration Date 12/2019        CLINICAL QUALITY " MEASURES:  Tobacco Screening & Intervention Screened & identified as tobacco non-user. Never smoker   WEIGHT SCREENING/BMI  Calculated BMI within normal parameteres & documented     Assessment/Plan     Jennifer was seen today for earache.    Diagnoses and all orders for this visit:    Suppurative otitis media of left ear without rupture of tympanic membrane    Other orders  -     cefdinir (OMNICEF) 300 MG capsule; Take 1 capsule by mouth 2 (Two) Times a Day.  -     MethylPREDNISolone (MEDROL, TIARA,) 4 MG tablet; Take as directed on package instructions.  -     fluconazole (DIFLUCAN) 150 MG tablet; Take 1 tablet by mouth, may repeat in 72 hours    Patient elects conservative management of upper respiratory congestion due to breastfeeding.     PLAN:  Patient should follow up with primary care provider if symptoms fail to improve, worsen or for the development of new symptoms that need attention. She may need ENT referral for refractory ear infection. She plans to discuss with PCP on 2/28/18 if not improved.     The patient voiced understanding and agreement to the patient treatment plan and instructions     RENEE Villalta

## 2018-10-22 ENCOUNTER — OFFICE VISIT (OUTPATIENT)
Dept: RETAIL CLINIC | Facility: CLINIC | Age: 38
End: 2018-10-22

## 2018-10-22 VITALS
WEIGHT: 152 LBS | HEIGHT: 67 IN | OXYGEN SATURATION: 99 % | SYSTOLIC BLOOD PRESSURE: 102 MMHG | TEMPERATURE: 98.2 F | HEART RATE: 87 BPM | DIASTOLIC BLOOD PRESSURE: 68 MMHG | RESPIRATION RATE: 18 BRPM | BODY MASS INDEX: 23.86 KG/M2

## 2018-10-22 DIAGNOSIS — H66.91 RIGHT OTITIS MEDIA, UNSPECIFIED OTITIS MEDIA TYPE: Primary | ICD-10-CM

## 2018-10-22 DIAGNOSIS — J06.9 URI WITH COUGH AND CONGESTION: ICD-10-CM

## 2018-10-22 PROCEDURE — 99213 OFFICE O/P EST LOW 20 MIN: CPT | Performed by: NURSE PRACTITIONER

## 2018-10-22 RX ORDER — FLUCONAZOLE 150 MG/1
TABLET ORAL
Qty: 2 TABLET | Refills: 0 | Status: SHIPPED | OUTPATIENT
Start: 2018-10-22 | End: 2018-10-22 | Stop reason: SDUPTHER

## 2018-10-22 RX ORDER — LEVOFLOXACIN 750 MG/1
750 TABLET ORAL DAILY
Qty: 5 TABLET | Refills: 0 | Status: SHIPPED | OUTPATIENT
Start: 2018-10-22 | End: 2018-10-22 | Stop reason: SDUPTHER

## 2018-10-22 RX ORDER — METHYLPREDNISOLONE 4 MG/1
TABLET ORAL
Qty: 1 EACH | Refills: 0 | Status: SHIPPED | OUTPATIENT
Start: 2018-10-22 | End: 2018-10-22 | Stop reason: SDUPTHER

## 2018-10-22 RX ORDER — LEVOFLOXACIN 750 MG/1
750 TABLET ORAL DAILY
Qty: 5 TABLET | Refills: 0 | Status: SHIPPED | OUTPATIENT
Start: 2018-10-22 | End: 2018-10-27

## 2018-10-22 RX ORDER — FLUCONAZOLE 150 MG/1
TABLET ORAL
Qty: 2 TABLET | Refills: 0 | Status: SHIPPED | OUTPATIENT
Start: 2018-10-22 | End: 2019-09-20 | Stop reason: HOSPADM

## 2018-10-22 RX ORDER — METHYLPREDNISOLONE 4 MG/1
TABLET ORAL
Qty: 1 EACH | Refills: 0 | Status: SHIPPED | OUTPATIENT
Start: 2018-10-22 | End: 2019-10-06

## 2018-10-22 NOTE — PATIENT INSTRUCTIONS
Otitis Media, Adult  Otitis media is redness, soreness, and puffiness (swelling) in the space just behind your eardrum (middle ear). It may be caused by allergies or infection. It often happens along with a cold.  Follow these instructions at home:  · Take your medicine as told. Finish it even if you start to feel better.  · Only take over-the-counter or prescription medicines for pain, discomfort, or fever as told by your doctor.  · Follow up with your doctor as told.  Contact a doctor if:  · You have otitis media only in one ear, or bleeding from your nose, or both.  · You notice a lump on your neck.  · You are not getting better in 3-5 days.  · You feel worse instead of better.  Get help right away if:  · You have pain that is not helped with medicine.  · You have puffiness, redness, or pain around your ear.  · You get a stiff neck.  · You cannot move part of your face (paralysis).  · You notice that the bone behind your ear hurts when you touch it.  This information is not intended to replace advice given to you by your health care provider. Make sure you discuss any questions you have with your health care provider.  Document Released: 06/05/2009 Document Revised: 05/25/2017 Document Reviewed: 07/15/2014  [employer] Interactive Patient Education © 2017 [employer]          Use saline nasal spray and steam heat inhalations  Warm compress may be soothing  You may continue over the counter relievers as needed  We discussed holding medrol dose pack  Follow up if not improving within 3 days, sooner if wore or new symptoms

## 2018-10-22 NOTE — PROGRESS NOTES
Subjective   Earache    Jennifer Cosme is a 38 y.o. female with a history of recurrent otitis media, presents with right earache. Patient reports URI symptoms x 10 days. She was treated for OM in Feb of this year, required multiple antibiotics and ENT visit. Symptoms ultimately resolved with Levaquin.     Earache    There is pain in the right ear. This is a new problem. The current episode started yesterday. The problem occurs constantly. There has been no fever. The pain is moderate (no pain following OTC NSAID). Associated symptoms include coughing, headaches (mild), rhinorrhea and a sore throat (minimal). Pertinent negatives include no diarrhea, ear discharge, hearing loss, neck pain, rash or vomiting. She has tried NSAIDs for the symptoms.   URI    This is a new problem. The current episode started 1 to 4 weeks ago. The problem has been unchanged. Associated symptoms include congestion, coughing, ear pain, headaches (mild), rhinorrhea and a sore throat (minimal). Pertinent negatives include no chest pain, diarrhea, nausea, neck pain, rash, vomiting or wheezing. She has tried decongestant for the symptoms. The treatment provided no relief.        History Obtained from: Patient    Past Medical History:   Diagnosis Date   • Female infertility     IVF pregnancy (2nd)   • GERD (gastroesophageal reflux disease)     worse during pregancy    • Infertility, female    • Nausea and vomiting during pregnancy    • Polycystic ovary syndrome     per labs   • Rh incompatibility     took Rhogam at 28 weeks      Past Surgical History:   Procedure Laterality Date   •  SECTION N/A 2017    Procedure:  SECTION REPEAT;  Surgeon: Anya Yanez MD;  [facility]: [facility];  Service:    •  SECTION PRIMARY     • EAR TUBES     • EYE MUSCLE SURGERY       Social History     Social History   • Marital status:      Spouse name: N/A   • Number of children: N/A   • Years of education: N/A      Occupational History   • Not on file.     Social History Main Topics   • Smoking status: Never Smoker   • Smokeless tobacco: Never Used   • Alcohol use No   • Drug use: No   • Sexual activity: Yes     Birth control/ protection: OCP     Other Topics Concern   • Not on file     Social History Narrative   • No narrative on file     No family history on file.  No Known Allergies  Current Outpatient Prescriptions   Medication Sig Dispense Refill   • Lactobacillus (PROBIOTIC ACIDOPHILUS PO) Take  by mouth.     • fluconazole (DIFLUCAN) 150 MG tablet Take 1 tablet by mouth, may repeat in 72 hours 2 tablet 0   • ibuprofen (ADVIL,MOTRIN) 600 MG tablet Take 1 tablet by mouth Every 6 (Six) Hours As Needed for Mild Pain (1-3). 30 tablet 2   • levoFLOXacin (LEVAQUIN) 750 MG tablet Take 1 tablet by mouth Daily for 5 days. 5 tablet 0   • MethylPREDNISolone (MEDROL, TIARA,) 4 MG tablet Take as directed on package instructions. 1 each 0     No current facility-administered medications for this visit.         The following portions of the patient's history were reviewed and updated as appropriate: allergies, current medications, past family history, past medical history, past social history and past surgical history.    Review of Systems   Constitutional: Positive for fatigue (mild). Negative for chills and fever.   HENT: Positive for congestion, ear pain, postnasal drip, rhinorrhea, sinus pressure and sore throat (minimal). Negative for ear discharge, hearing loss and trouble swallowing.    Eyes: Negative.    Respiratory: Positive for cough. Negative for chest tightness, shortness of breath and wheezing.    Cardiovascular: Negative for chest pain and palpitations.   Gastrointestinal: Negative for abdominal distention, diarrhea, nausea and vomiting.   Endocrine: Negative for polydipsia, polyphagia and polyuria.   Musculoskeletal: Negative for myalgias, neck pain and neck stiffness.   Skin: Negative for rash and wound.  "  Allergic/Immunologic: Negative for immunocompromised state.   Neurological: Positive for light-headedness and headaches (mild). Negative for dizziness.   Hematological: Negative for adenopathy. Does not bruise/bleed easily.   Psychiatric/Behavioral: Negative.        Objective     VITAL SIGNS:   Vitals:    10/22/18 1026   BP: 102/68   Pulse: 87   Resp: 18   Temp: 98.2 °F (36.8 °C)   SpO2: 99%   Weight: 68.9 kg (152 lb)   Height: 170.2 cm (67\")   PainSc: 0-No pain   Body mass index is 23.81 kg/m².    Physical Exam   Constitutional: She is cooperative.  Non-toxic appearance. No distress.   HENT:   Head: Normocephalic and atraumatic.   Right Ear: External ear normal. There is swelling (there is mild edema and moderate erythema to right ear canal). No tenderness. Tympanic membrane is erythematous. Tympanic membrane is not perforated. A middle ear effusion is present.   Left Ear: External ear and ear canal normal. Tympanic membrane is not perforated and not erythematous. A middle ear effusion is present.   Nose: Mucosal edema and rhinorrhea present. No nasal deformity. Right sinus exhibits no maxillary sinus tenderness and no frontal sinus tenderness. Left sinus exhibits no maxillary sinus tenderness and no frontal sinus tenderness.   Mouth/Throat: Uvula is midline and mucous membranes are normal. No uvula swelling. Posterior oropharyngeal erythema present. No oropharyngeal exudate or posterior oropharyngeal edema. Tonsils are 1+ on the right. Tonsils are 1+ on the left. No tonsillar exudate.   Eyes: Pupils are equal, round, and reactive to light. Conjunctivae, EOM and lids are normal. No scleral icterus.   Neck: Normal range of motion and phonation normal. No tracheal deviation present.   Cardiovascular: Normal rate and regular rhythm.    No murmur heard.  Pulmonary/Chest: Breath sounds normal. No accessory muscle usage. No tachypnea. No respiratory distress. She exhibits no bony tenderness.   Congested cough " appreciated     Lymphadenopathy:     She has no cervical adenopathy.        Right: No supraclavicular adenopathy present.        Left: No supraclavicular adenopathy present.   Neurological: She is alert. She is not disoriented. Coordination and gait normal.   Skin: Skin is warm and dry. No cyanosis. No pallor. Nails show no clubbing.   Psychiatric: Her behavior is normal. She is attentive.         CLINICAL QUALITY MEASURES:  Tobacco Screening & Intervention Screened & identified as tobacco non-user. Never smoker   WEIGHT SCREENING/BMI  Calculated BMI within normal parameteres & documented     Assessment/Plan     Jennifer was seen today for earache.    Diagnoses and all orders for this visit:    Right otitis media, unspecified otitis media type  URI with cough and congestion  -     fluconazole (DIFLUCAN) 150 MG tablet; Take 1 tablet by mouth, may repeat in 72 hours  -     Hold for worsening sinus/nasal congestion: MethylPREDNISolone (MEDROL, TIARA,) 4 MG tablet; Take as directed on package instructions.  -     levoFLOXacin (LEVAQUIN) 750 MG tablet; Take 1 tablet by mouth Daily for 5 days.      PLAN:   Most recent ear infection 8 mo ago was refractory to multiple antibiotics, but did respond to Levaquin. Patient has also responded favorably to Medrol in past, will hold for worsening nasal/sinus symptoms while awaiting antibiotic response. 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 Patient should follow up with primary care provider, ENT or RTC if symptoms fail to improve, worsen or for the development of new symptoms that need attention.    The patient voiced understanding and agreement to the patient treatment plan and instructions     RENEE Villalta

## 2018-10-29 DIAGNOSIS — H66.91 OTITIS MEDIA FOLLOW-UP, NOT RESOLVED, RIGHT: Primary | ICD-10-CM

## 2018-10-29 RX ORDER — CEFDINIR 300 MG/1
300 CAPSULE ORAL 2 TIMES DAILY
Qty: 20 CAPSULE | Refills: 0 | Status: SHIPPED | OUTPATIENT
Start: 2018-10-29 | End: 2019-06-27

## 2018-10-29 RX ORDER — CEFDINIR 300 MG/1
300 CAPSULE ORAL 2 TIMES DAILY
Qty: 20 CAPSULE | Refills: 0 | Status: SHIPPED | OUTPATIENT
Start: 2018-10-29 | End: 2018-10-29 | Stop reason: SDUPTHER

## 2018-10-29 NOTE — PROGRESS NOTES
Patient has persistent OM despite Levaquin. Did not take dose pack. Of not, patient did home pregnancy test (-) prior to starting Levaquin. RX Omnicef sent to pharmacy. Patient to follow-up with PCP.

## 2019-03-05 ENCOUNTER — LAB REQUISITION (OUTPATIENT)
Dept: LAB | Facility: HOSPITAL | Age: 39
End: 2019-03-05

## 2019-03-05 DIAGNOSIS — Z00.00 ROUTINE GENERAL MEDICAL EXAMINATION AT A HEALTH CARE FACILITY: ICD-10-CM

## 2019-03-05 DIAGNOSIS — O00.109 TUBAL PREGNANCY WITHOUT INTRAUTERINE PREGNANCY: ICD-10-CM

## 2019-03-05 DIAGNOSIS — O02.1 MISSED ABORTION: ICD-10-CM

## 2019-03-05 LAB
ALBUMIN SERPL-MCNC: 4.72 G/DL (ref 3.2–4.8)
ALBUMIN/GLOB SERPL: 2.7 G/DL (ref 1.5–2.5)
ALP SERPL-CCNC: 46 U/L (ref 25–100)
ALT SERPL W P-5'-P-CCNC: 12 U/L (ref 7–40)
ANION GAP SERPL CALCULATED.3IONS-SCNC: 7 MMOL/L (ref 3–11)
AST SERPL-CCNC: 10 U/L (ref 0–33)
BILIRUB SERPL-MCNC: 0.6 MG/DL (ref 0.3–1.2)
BUN BLD-MCNC: 11 MG/DL (ref 9–23)
BUN/CREAT SERPL: 18.6 (ref 7–25)
CALCIUM SPEC-SCNC: 8.9 MG/DL (ref 8.7–10.4)
CHLORIDE SERPL-SCNC: 105 MMOL/L (ref 99–109)
CO2 SERPL-SCNC: 27 MMOL/L (ref 20–31)
CREAT BLD-MCNC: 0.59 MG/DL (ref 0.6–1.3)
DEPRECATED RDW RBC AUTO: 43.1 FL (ref 37–54)
ERYTHROCYTE [DISTWIDTH] IN BLOOD BY AUTOMATED COUNT: 12.9 % (ref 11.3–14.5)
GFR SERPL CREATININE-BSD FRML MDRD: 115 ML/MIN/1.73
GLOBULIN UR ELPH-MCNC: 1.8 GM/DL
GLUCOSE BLD-MCNC: 88 MG/DL (ref 70–100)
HCG INTACT+B SERPL-ACNC: 114 MIU/ML
HCT VFR BLD AUTO: 40.7 % (ref 34.5–44)
HGB BLD-MCNC: 13.4 G/DL (ref 11.5–15.5)
MCH RBC QN AUTO: 30 PG (ref 27–31)
MCHC RBC AUTO-ENTMCNC: 32.9 G/DL (ref 32–36)
MCV RBC AUTO: 91.3 FL (ref 80–99)
PLATELET # BLD AUTO: 217 10*3/MM3 (ref 150–450)
PMV BLD AUTO: 11.5 FL (ref 6–12)
POTASSIUM BLD-SCNC: 4.1 MMOL/L (ref 3.5–5.5)
PROT SERPL-MCNC: 6.5 G/DL (ref 5.7–8.2)
RBC # BLD AUTO: 4.46 10*6/MM3 (ref 3.89–5.14)
SODIUM BLD-SCNC: 139 MMOL/L (ref 132–146)
WBC NRBC COR # BLD: 5.46 10*3/MM3 (ref 3.5–10.8)

## 2019-03-05 PROCEDURE — 85027 COMPLETE CBC AUTOMATED: CPT | Performed by: OBSTETRICS & GYNECOLOGY

## 2019-03-05 PROCEDURE — 84702 CHORIONIC GONADOTROPIN TEST: CPT | Performed by: OBSTETRICS & GYNECOLOGY

## 2019-03-05 PROCEDURE — 80053 COMPREHEN METABOLIC PANEL: CPT | Performed by: OBSTETRICS & GYNECOLOGY

## 2019-06-26 ENCOUNTER — OFFICE VISIT (OUTPATIENT)
Dept: RETAIL CLINIC | Facility: CLINIC | Age: 39
End: 2019-06-26

## 2019-06-26 VITALS
HEART RATE: 98 BPM | DIASTOLIC BLOOD PRESSURE: 62 MMHG | WEIGHT: 154 LBS | SYSTOLIC BLOOD PRESSURE: 94 MMHG | HEIGHT: 67 IN | RESPIRATION RATE: 18 BRPM | TEMPERATURE: 97.7 F | BODY MASS INDEX: 24.17 KG/M2 | OXYGEN SATURATION: 100 %

## 2019-06-26 DIAGNOSIS — H65.92 LEFT OTITIS MEDIA WITH EFFUSION: ICD-10-CM

## 2019-06-26 DIAGNOSIS — J01.40 ACUTE PANSINUSITIS, RECURRENCE NOT SPECIFIED: Primary | ICD-10-CM

## 2019-06-26 PROCEDURE — 99213 OFFICE O/P EST LOW 20 MIN: CPT | Performed by: NURSE PRACTITIONER

## 2019-06-26 RX ORDER — PSEUDOEPHEDRINE HYDROCHLORIDE 60 MG/1
60 TABLET, FILM COATED ORAL EVERY 4 HOURS PRN
COMMUNITY
End: 2022-11-17

## 2019-06-26 RX ORDER — AMOXICILLIN AND CLAVULANATE POTASSIUM 562.5; 437.5; 62.5 MG/1; MG/1; MG/1
2 TABLET, FILM COATED, EXTENDED RELEASE ORAL 2 TIMES DAILY
Qty: 28 TABLET | Refills: 0 | Status: SHIPPED | OUTPATIENT
Start: 2019-06-26 | End: 2019-06-27 | Stop reason: RX

## 2019-06-26 RX ORDER — FLUCONAZOLE 150 MG/1
TABLET ORAL
Qty: 2 TABLET | Refills: 1 | Status: SHIPPED | OUTPATIENT
Start: 2019-06-26 | End: 2019-09-20 | Stop reason: HOSPADM

## 2019-06-26 NOTE — PROGRESS NOTES
Subjective   Earache and Cough    Jennifer Cosme is a 39 y.o. female presents with left earache. Sinus symptoms presents > 3 weeks, did not respond to Sudafed, steroid pack. Reports significant decrease in sense of smell and taste. This patient with a history of sinusitis and refractory otitis media this past October, requiring multiple antibiotics and oral steroids doses.      Earache    There is pain in the left ear. This is a new problem. The current episode started in the past 7 days. The problem has been waxing and waning. The pain is mild. Associated symptoms include coughing and headaches (facial). Pertinent negatives include no abdominal pain, diarrhea, ear discharge, neck pain, rash, sore throat or vomiting. Treatments tried: steroids. The treatment provided no relief.   Cough   This is a new problem. The current episode started 1 to 4 weeks ago. The problem has been waxing and waning. The problem occurs every few minutes. Cough characteristics: occasionally produtive. Associated symptoms include ear congestion, ear pain, headaches (facial), nasal congestion (mild) and postnasal drip. Pertinent negatives include no chest pain, chills, fever, heartburn, hemoptysis, rash, sore throat, shortness of breath, weight loss or wheezing. Nothing aggravates the symptoms. Treatments tried: Medrol. Her past medical history is significant for bronchitis and environmental allergies.        History Obtained from: Patient    Past Medical History:   Diagnosis Date   • Female infertility     IVF pregnancy (2nd)   • GERD (gastroesophageal reflux disease)     worse during pregancy    • Infertility, female    • Nausea and vomiting during pregnancy    • Polycystic ovary syndrome     per labs   • Rh incompatibility     took Rhogam at 28 weeks      Past Surgical History:   Procedure Laterality Date   •  SECTION N/A 2017    Procedure:  SECTION REPEAT;  Surgeon: Anya Yanez MD;  Location: [facility]  DELIVERY;  Service:    •  SECTION PRIMARY     • EAR TUBES     • EYE MUSCLE SURGERY       Social History     Tobacco Use   • Smoking status: Never Smoker   • Smokeless tobacco: Never Used   Substance Use Topics   • Alcohol use: No   • Drug use: No     No family history on file.  No Known Allergies  Current Outpatient Medications   Medication Sig Dispense Refill   • Lactobacillus (PROBIOTIC ACIDOPHILUS PO) Take  by mouth.     • pseudoephedrine (SUDAFED) 60 MG tablet Take 60 mg by mouth Every 4 (Four) Hours As Needed for Congestion.     • amoxicillin-clavulanate XR (AUGMENTIN XR) 1000-62.5 MG per 12 hr tablet Take 2 tablets by mouth 2 (Two) Times a Day for 7 days. 28 tablet 0   • cefdinir (OMNICEF) 300 MG capsule Take 1 capsule by mouth 2 (Two) Times a Day. 20 capsule 0   • fluconazole (DIFLUCAN) 150 MG tablet Take 1 tablet by mouth, may repeat in 72 hours 2 tablet 0   • fluconazole (DIFLUCAN) 150 MG tablet Take 1 tablet an onset of symptoms, repeat in 72 hours if needed 2 tablet 1   • ibuprofen (ADVIL,MOTRIN) 600 MG tablet Take 1 tablet by mouth Every 6 (Six) Hours As Needed for Mild Pain (1-3). 30 tablet 2   • MethylPREDNISolone (MEDROL, TIARA,) 4 MG tablet Take as directed on package instructions. 1 each 0     No current facility-administered medications for this visit.         The following portions of the patient's history were reviewed and updated as appropriate: allergies, current medications, past family history, past medical history, past social history and past surgical history.    Review of Systems   Constitutional: Negative for chills, fever and weight loss.        Decreased smell and taste     HENT: Positive for congestion (mild), ear pain and postnasal drip. Negative for ear discharge, sinus pain, sneezing, sore throat and trouble swallowing.    Eyes: Negative.    Respiratory: Positive for cough. Negative for hemoptysis, shortness of breath and wheezing.    Cardiovascular: Negative for chest pain  "and palpitations.   Gastrointestinal: Negative for abdominal pain, diarrhea, heartburn, nausea and vomiting.   Endocrine: Negative.    Musculoskeletal: Negative for gait problem, neck pain and neck stiffness.   Skin: Negative for rash and wound.   Allergic/Immunologic: Positive for environmental allergies. Negative for immunocompromised state.   Neurological: Positive for headaches (facial). Negative for dizziness.   Hematological: Negative.    Psychiatric/Behavioral: Negative.        Objective     VITAL SIGNS:   Vitals:    06/26/19 1217   BP: 94/62   Pulse: 98   Resp: 18   Temp: 97.7 °F (36.5 °C)   SpO2: 100%   Weight: 69.9 kg (154 lb)   Height: 170.2 cm (67\")   Body mass index is 24.12 kg/m².    Physical Exam   Constitutional:  Non-toxic appearance. She does not appear ill. No distress.   HENT:   Head: Atraumatic.   Right Ear: Tympanic membrane, external ear and ear canal normal.   Left Ear: External ear and ear canal normal. Tympanic membrane is erythematous and retracted. Tympanic membrane is not perforated.   Nose: Mucosal edema (brisk erythema of nasal mucous membranes) present. No nasal deformity.   Mouth/Throat: Uvula is midline and mucous membranes are normal. No uvula swelling. Posterior oropharyngeal erythema present. No posterior oropharyngeal edema.   Eyes: Pupils are equal, round, and reactive to light. No scleral icterus.   Neck: Normal range of motion and phonation normal. Neck supple. No tracheal deviation present.   Cardiovascular: Normal rate and regular rhythm.   No murmur heard.  Pulmonary/Chest: Breath sounds normal. No accessory muscle usage. No tachypnea. No respiratory distress.   Dry cough appreciated   Lymphadenopathy:     She has no cervical adenopathy.        Right: No supraclavicular adenopathy present.        Left: No supraclavicular adenopathy present.   Neurological: She is alert. Gait normal.   Skin: Skin is warm and dry. Capillary refill takes less than 2 seconds. Nails show no " clubbing.   Psychiatric: Her behavior is normal. She is attentive.             Assessment/Plan     Jennifer was seen today for earache and cough.    Diagnoses and all orders for this visit:    Acute pansinusitis, recurrence not specified  -     amoxicillin-clavulanate XR (AUGMENTIN XR) 1000-62.5 MG per 12 hr tablet; Take 2 tablets by mouth 2 (Two) Times a Day for 7 days.    Left otitis media with effusion    Other orders  -     fluconazole (DIFLUCAN) 150 MG tablet; Take 1 tablet an onset of symptoms, repeat in 72 hours if needed      PLAN: Steam heat and saline nasal spray use advised. Patient to continue oral decongestants.  Patient should follow up with primary care provider if symptoms fail to improve, worsen or for the development of new symptoms that need attention.    The patient voiced understanding and agreement to the patient treatment plan and instructions       RENEE Villalta

## 2019-06-27 RX ORDER — AMOXICILLIN AND CLAVULANATE POTASSIUM 875; 125 MG/1; MG/1
1 TABLET, FILM COATED ORAL EVERY 12 HOURS SCHEDULED
Qty: 20 TABLET | Refills: 0 | Status: SHIPPED | OUTPATIENT
Start: 2019-06-27 | End: 2019-07-07

## 2019-09-19 ENCOUNTER — APPOINTMENT (OUTPATIENT)
Dept: ULTRASOUND IMAGING | Facility: HOSPITAL | Age: 39
End: 2019-09-19

## 2019-09-19 ENCOUNTER — HOSPITAL ENCOUNTER (OUTPATIENT)
Facility: HOSPITAL | Age: 39
Discharge: HOME | End: 2019-09-20
Attending: EMERGENCY MEDICINE | Admitting: OBSTETRICS & GYNECOLOGY

## 2019-09-19 ENCOUNTER — ANESTHESIA (OUTPATIENT)
Dept: PERIOP | Facility: HOSPITAL | Age: 39
End: 2019-09-19

## 2019-09-19 ENCOUNTER — ANESTHESIA EVENT (OUTPATIENT)
Dept: PERIOP | Facility: HOSPITAL | Age: 39
End: 2019-09-19

## 2019-09-19 DIAGNOSIS — O00.201 RIGHT OVARIAN PREGNANCY WITHOUT INTRAUTERINE PREGNANCY: ICD-10-CM

## 2019-09-19 DIAGNOSIS — O00.90 ECTOPIC PREGNANCY: ICD-10-CM

## 2019-09-19 DIAGNOSIS — O00.90 ECTOPIC PREGNANCY WITHOUT INTRAUTERINE PREGNANCY, UNSPECIFIED LOCATION: Primary | ICD-10-CM

## 2019-09-19 LAB
ABO GROUP BLD: NORMAL
ALBUMIN SERPL-MCNC: 4.9 G/DL (ref 3.5–5.2)
ALBUMIN/GLOB SERPL: 2.2 G/DL
ALP SERPL-CCNC: 43 U/L (ref 39–117)
ALT SERPL W P-5'-P-CCNC: 12 U/L (ref 1–33)
ANION GAP SERPL CALCULATED.3IONS-SCNC: 12 MMOL/L (ref 5–15)
AST SERPL-CCNC: 13 U/L (ref 1–32)
B-HCG UR QL: POSITIVE
BASOPHILS # BLD AUTO: 0.07 10*3/MM3 (ref 0–0.2)
BASOPHILS NFR BLD AUTO: 0.6 % (ref 0–1.5)
BILIRUB SERPL-MCNC: 0.4 MG/DL (ref 0.2–1.2)
BILIRUB UR QL STRIP: NEGATIVE
BLD GP AB SCN SERPL QL: NEGATIVE
BUN BLD-MCNC: 13 MG/DL (ref 6–20)
BUN/CREAT SERPL: 23.2 (ref 7–25)
CALCIUM SPEC-SCNC: 9 MG/DL (ref 8.6–10.5)
CHLORIDE SERPL-SCNC: 104 MMOL/L (ref 98–107)
CLARITY UR: CLEAR
CO2 SERPL-SCNC: 25 MMOL/L (ref 22–29)
COLOR UR: YELLOW
CREAT BLD-MCNC: 0.56 MG/DL (ref 0.57–1)
DEPRECATED RDW RBC AUTO: 40.6 FL (ref 37–54)
EOSINOPHIL # BLD AUTO: 0.01 10*3/MM3 (ref 0–0.4)
EOSINOPHIL NFR BLD AUTO: 0.1 % (ref 0.3–6.2)
ERYTHROCYTE [DISTWIDTH] IN BLOOD BY AUTOMATED COUNT: 12.3 % (ref 12.3–15.4)
GFR SERPL CREATININE-BSD FRML MDRD: 122 ML/MIN/1.73
GLOBULIN UR ELPH-MCNC: 2.2 GM/DL
GLUCOSE BLD-MCNC: 111 MG/DL (ref 65–99)
GLUCOSE UR STRIP-MCNC: NEGATIVE MG/DL
HCG INTACT+B SERPL-ACNC: 1929 MIU/ML
HCT VFR BLD AUTO: 39.2 % (ref 34–46.6)
HGB BLD-MCNC: 13 G/DL (ref 12–15.9)
HGB UR QL STRIP.AUTO: NEGATIVE
HOLD SPECIMEN: NORMAL
HOLD SPECIMEN: NORMAL
IMM GRANULOCYTES # BLD AUTO: 0.03 10*3/MM3 (ref 0–0.05)
IMM GRANULOCYTES NFR BLD AUTO: 0.3 % (ref 0–0.5)
INTERNAL NEGATIVE CONTROL: NEGATIVE
INTERNAL POSITIVE CONTROL: POSITIVE
KETONES UR QL STRIP: ABNORMAL
LEUKOCYTE ESTERASE UR QL STRIP.AUTO: NEGATIVE
LIPASE SERPL-CCNC: 25 U/L (ref 13–60)
LYMPHOCYTES # BLD AUTO: 1.12 10*3/MM3 (ref 0.7–3.1)
LYMPHOCYTES NFR BLD AUTO: 9.6 % (ref 19.6–45.3)
Lab: ABNORMAL
MCH RBC QN AUTO: 29.6 PG (ref 26.6–33)
MCHC RBC AUTO-ENTMCNC: 33.2 G/DL (ref 31.5–35.7)
MCV RBC AUTO: 89.3 FL (ref 79–97)
MONOCYTES # BLD AUTO: 0.44 10*3/MM3 (ref 0.1–0.9)
MONOCYTES NFR BLD AUTO: 3.8 % (ref 5–12)
NEUTROPHILS # BLD AUTO: 9.98 10*3/MM3 (ref 1.7–7)
NEUTROPHILS NFR BLD AUTO: 85.6 % (ref 42.7–76)
NITRITE UR QL STRIP: NEGATIVE
NRBC BLD AUTO-RTO: 0 /100 WBC (ref 0–0.2)
PH UR STRIP.AUTO: 7 [PH] (ref 5–8)
PLATELET # BLD AUTO: 274 10*3/MM3 (ref 140–450)
PMV BLD AUTO: 10.9 FL (ref 6–12)
POTASSIUM BLD-SCNC: 4.1 MMOL/L (ref 3.5–5.2)
PROT SERPL-MCNC: 7.1 G/DL (ref 6–8.5)
PROT UR QL STRIP: NEGATIVE
RBC # BLD AUTO: 4.39 10*6/MM3 (ref 3.77–5.28)
RH BLD: NEGATIVE
SODIUM BLD-SCNC: 141 MMOL/L (ref 136–145)
SP GR UR STRIP: 1.02 (ref 1–1.03)
T&S EXPIRATION DATE: NORMAL
UROBILINOGEN UR QL STRIP: ABNORMAL
WBC NRBC COR # BLD: 11.65 10*3/MM3 (ref 3.4–10.8)
WHOLE BLOOD HOLD SPECIMEN: NORMAL
WHOLE BLOOD HOLD SPECIMEN: NORMAL

## 2019-09-19 PROCEDURE — 96372 THER/PROPH/DIAG INJ SC/IM: CPT

## 2019-09-19 PROCEDURE — 25010000003 LIDOCAINE 1 % SOLUTION: Performed by: ANESTHESIOLOGY

## 2019-09-19 PROCEDURE — 25010000002 DEXAMETHASONE PER 1 MG: Performed by: ANESTHESIOLOGY

## 2019-09-19 PROCEDURE — 25010000002 RHO D IMMUNE GLOBULIN 1500 UNIT/2ML SOLUTION PREFILLED SYRINGE: Performed by: OBSTETRICS & GYNECOLOGY

## 2019-09-19 PROCEDURE — 86901 BLOOD TYPING SEROLOGIC RH(D): CPT | Performed by: EMERGENCY MEDICINE

## 2019-09-19 PROCEDURE — 88305 TISSUE EXAM BY PATHOLOGIST: CPT | Performed by: OBSTETRICS & GYNECOLOGY

## 2019-09-19 PROCEDURE — 84702 CHORIONIC GONADOTROPIN TEST: CPT | Performed by: EMERGENCY MEDICINE

## 2019-09-19 PROCEDURE — 96375 TX/PRO/DX INJ NEW DRUG ADDON: CPT

## 2019-09-19 PROCEDURE — 25010000002 SUCCINYLCHOLINE PER 20 MG: Performed by: ANESTHESIOLOGY

## 2019-09-19 PROCEDURE — 25010000002 MORPHINE PER 10 MG: Performed by: EMERGENCY MEDICINE

## 2019-09-19 PROCEDURE — 80053 COMPREHEN METABOLIC PANEL: CPT | Performed by: EMERGENCY MEDICINE

## 2019-09-19 PROCEDURE — 81025 URINE PREGNANCY TEST: CPT | Performed by: EMERGENCY MEDICINE

## 2019-09-19 PROCEDURE — 96374 THER/PROPH/DIAG INJ IV PUSH: CPT

## 2019-09-19 PROCEDURE — 25010000002 ONDANSETRON PER 1 MG: Performed by: EMERGENCY MEDICINE

## 2019-09-19 PROCEDURE — C1765 ADHESION BARRIER: HCPCS | Performed by: OBSTETRICS & GYNECOLOGY

## 2019-09-19 PROCEDURE — 81003 URINALYSIS AUTO W/O SCOPE: CPT | Performed by: EMERGENCY MEDICINE

## 2019-09-19 PROCEDURE — 76817 TRANSVAGINAL US OBSTETRIC: CPT

## 2019-09-19 PROCEDURE — 25010000002 ONDANSETRON PER 1 MG: Performed by: ANESTHESIOLOGY

## 2019-09-19 PROCEDURE — C1889 IMPLANT/INSERT DEVICE, NOC: HCPCS | Performed by: OBSTETRICS & GYNECOLOGY

## 2019-09-19 PROCEDURE — 86900 BLOOD TYPING SEROLOGIC ABO: CPT | Performed by: EMERGENCY MEDICINE

## 2019-09-19 PROCEDURE — 25010000002 PROPOFOL 10 MG/ML EMULSION: Performed by: ANESTHESIOLOGY

## 2019-09-19 PROCEDURE — 0: Performed by: OBSTETRICS & GYNECOLOGY

## 2019-09-19 PROCEDURE — 25010000002 FENTANYL CITRATE (PF) 100 MCG/2ML SOLUTION: Performed by: ANESTHESIOLOGY

## 2019-09-19 PROCEDURE — 85025 COMPLETE CBC W/AUTO DIFF WBC: CPT | Performed by: EMERGENCY MEDICINE

## 2019-09-19 PROCEDURE — 25010000002 HYDROMORPHONE PER 4 MG: Performed by: ANESTHESIOLOGY

## 2019-09-19 PROCEDURE — 99284 EMERGENCY DEPT VISIT MOD MDM: CPT

## 2019-09-19 PROCEDURE — 83690 ASSAY OF LIPASE: CPT | Performed by: EMERGENCY MEDICINE

## 2019-09-19 PROCEDURE — 86850 RBC ANTIBODY SCREEN: CPT | Performed by: EMERGENCY MEDICINE

## 2019-09-19 RX ORDER — DIPHENHYDRAMINE HCL 25 MG
25 CAPSULE ORAL NIGHTLY PRN
Status: DISCONTINUED | OUTPATIENT
Start: 2019-09-19 | End: 2019-09-20 | Stop reason: HOSPADM

## 2019-09-19 RX ORDER — DIPHENHYDRAMINE HYDROCHLORIDE 50 MG/ML
25 INJECTION INTRAMUSCULAR; INTRAVENOUS NIGHTLY PRN
Status: DISCONTINUED | OUTPATIENT
Start: 2019-09-19 | End: 2019-09-20 | Stop reason: HOSPADM

## 2019-09-19 RX ORDER — SODIUM CHLORIDE 0.9 % (FLUSH) 0.9 %
3-10 SYRINGE (ML) INJECTION AS NEEDED
Status: DISCONTINUED | OUTPATIENT
Start: 2019-09-19 | End: 2019-09-19 | Stop reason: HOSPADM

## 2019-09-19 RX ORDER — SIMETHICONE 80 MG
80 TABLET,CHEWABLE ORAL 4 TIMES DAILY PRN
Status: DISCONTINUED | OUTPATIENT
Start: 2019-09-19 | End: 2019-09-20 | Stop reason: HOSPADM

## 2019-09-19 RX ORDER — FENTANYL CITRATE 50 UG/ML
50 INJECTION, SOLUTION INTRAMUSCULAR; INTRAVENOUS
Status: DISCONTINUED | OUTPATIENT
Start: 2019-09-19 | End: 2019-09-19 | Stop reason: HOSPADM

## 2019-09-19 RX ORDER — GLYCOPYRROLATE 0.2 MG/ML
INJECTION INTRAMUSCULAR; INTRAVENOUS AS NEEDED
Status: DISCONTINUED | OUTPATIENT
Start: 2019-09-19 | End: 2019-09-19 | Stop reason: SURG

## 2019-09-19 RX ORDER — ONDANSETRON 2 MG/ML
4 INJECTION INTRAMUSCULAR; INTRAVENOUS ONCE
Status: DISCONTINUED | OUTPATIENT
Start: 2019-09-20 | End: 2019-09-20 | Stop reason: HOSPADM

## 2019-09-19 RX ORDER — PROPOFOL 10 MG/ML
VIAL (ML) INTRAVENOUS AS NEEDED
Status: DISCONTINUED | OUTPATIENT
Start: 2019-09-19 | End: 2019-09-19 | Stop reason: SURG

## 2019-09-19 RX ORDER — MORPHINE SULFATE 4 MG/ML
4 INJECTION, SOLUTION INTRAMUSCULAR; INTRAVENOUS ONCE
Status: COMPLETED | OUTPATIENT
Start: 2019-09-19 | End: 2019-09-19

## 2019-09-19 RX ORDER — HYDROCODONE BITARTRATE AND ACETAMINOPHEN 5; 325 MG/1; MG/1
1 TABLET ORAL EVERY 4 HOURS PRN
Status: DISCONTINUED | OUTPATIENT
Start: 2019-09-19 | End: 2019-09-20 | Stop reason: HOSPADM

## 2019-09-19 RX ORDER — FAMOTIDINE 10 MG/ML
20 INJECTION, SOLUTION INTRAVENOUS ONCE
Status: COMPLETED | OUTPATIENT
Start: 2019-09-19 | End: 2019-09-19

## 2019-09-19 RX ORDER — BUPIVACAINE HYDROCHLORIDE AND EPINEPHRINE 5; 5 MG/ML; UG/ML
INJECTION, SOLUTION PERINEURAL AS NEEDED
Status: DISCONTINUED | OUTPATIENT
Start: 2019-09-19 | End: 2019-09-19 | Stop reason: HOSPADM

## 2019-09-19 RX ORDER — SODIUM CHLORIDE 9 MG/ML
INJECTION, SOLUTION INTRAVENOUS AS NEEDED
Status: DISCONTINUED | OUTPATIENT
Start: 2019-09-19 | End: 2019-09-19 | Stop reason: HOSPADM

## 2019-09-19 RX ORDER — HYDROMORPHONE HYDROCHLORIDE 1 MG/ML
0.5 INJECTION, SOLUTION INTRAMUSCULAR; INTRAVENOUS; SUBCUTANEOUS
Status: DISCONTINUED | OUTPATIENT
Start: 2019-09-19 | End: 2019-09-19 | Stop reason: HOSPADM

## 2019-09-19 RX ORDER — CHLORAL HYDRATE 500 MG
2000 CAPSULE ORAL
COMMUNITY
End: 2022-11-17

## 2019-09-19 RX ORDER — SODIUM CHLORIDE 0.9 % (FLUSH) 0.9 %
3 SYRINGE (ML) INJECTION EVERY 12 HOURS SCHEDULED
Status: DISCONTINUED | OUTPATIENT
Start: 2019-09-19 | End: 2019-09-19 | Stop reason: HOSPADM

## 2019-09-19 RX ORDER — SODIUM CHLORIDE 0.9 % (FLUSH) 0.9 %
10 SYRINGE (ML) INJECTION AS NEEDED
Status: DISCONTINUED | OUTPATIENT
Start: 2019-09-19 | End: 2019-09-20 | Stop reason: HOSPADM

## 2019-09-19 RX ORDER — IBUPROFEN 600 MG/1
600 TABLET ORAL EVERY 6 HOURS PRN
Status: DISCONTINUED | OUTPATIENT
Start: 2019-09-19 | End: 2019-09-19 | Stop reason: SDUPTHER

## 2019-09-19 RX ORDER — MEPERIDINE HYDROCHLORIDE 25 MG/ML
12.5 INJECTION INTRAMUSCULAR; INTRAVENOUS; SUBCUTANEOUS
Status: DISCONTINUED | OUTPATIENT
Start: 2019-09-19 | End: 2019-09-19 | Stop reason: HOSPADM

## 2019-09-19 RX ORDER — ONDANSETRON 2 MG/ML
INJECTION INTRAMUSCULAR; INTRAVENOUS AS NEEDED
Status: DISCONTINUED | OUTPATIENT
Start: 2019-09-19 | End: 2019-09-19 | Stop reason: SURG

## 2019-09-19 RX ORDER — ONDANSETRON 2 MG/ML
4 INJECTION INTRAMUSCULAR; INTRAVENOUS ONCE
Status: COMPLETED | OUTPATIENT
Start: 2019-09-19 | End: 2019-09-19

## 2019-09-19 RX ORDER — SCOLOPAMINE TRANSDERMAL SYSTEM 1 MG/1
1 PATCH, EXTENDED RELEASE TRANSDERMAL ONCE
Status: DISCONTINUED | OUTPATIENT
Start: 2019-09-19 | End: 2019-09-19

## 2019-09-19 RX ORDER — DOCUSATE SODIUM 100 MG/1
100 CAPSULE, LIQUID FILLED ORAL 2 TIMES DAILY PRN
Status: DISCONTINUED | OUTPATIENT
Start: 2019-09-19 | End: 2019-09-20 | Stop reason: HOSPADM

## 2019-09-19 RX ORDER — LIDOCAINE HYDROCHLORIDE 10 MG/ML
0.5 INJECTION, SOLUTION EPIDURAL; INFILTRATION; INTRACAUDAL; PERINEURAL ONCE AS NEEDED
Status: DISCONTINUED | OUTPATIENT
Start: 2019-09-19 | End: 2019-09-19 | Stop reason: HOSPADM

## 2019-09-19 RX ORDER — FAMOTIDINE 20 MG/1
20 TABLET, FILM COATED ORAL ONCE
Status: DISCONTINUED | OUTPATIENT
Start: 2019-09-19 | End: 2019-09-19 | Stop reason: HOSPADM

## 2019-09-19 RX ORDER — DEXAMETHASONE SODIUM PHOSPHATE 4 MG/ML
INJECTION, SOLUTION INTRA-ARTICULAR; INTRALESIONAL; INTRAMUSCULAR; INTRAVENOUS; SOFT TISSUE AS NEEDED
Status: DISCONTINUED | OUTPATIENT
Start: 2019-09-19 | End: 2019-09-19 | Stop reason: SURG

## 2019-09-19 RX ORDER — SODIUM CHLORIDE, SODIUM LACTATE, POTASSIUM CHLORIDE, CALCIUM CHLORIDE 600; 310; 30; 20 MG/100ML; MG/100ML; MG/100ML; MG/100ML
9 INJECTION, SOLUTION INTRAVENOUS CONTINUOUS
Status: DISCONTINUED | OUTPATIENT
Start: 2019-09-19 | End: 2019-09-19

## 2019-09-19 RX ORDER — LIDOCAINE HYDROCHLORIDE 10 MG/ML
INJECTION, SOLUTION INFILTRATION; PERINEURAL AS NEEDED
Status: DISCONTINUED | OUTPATIENT
Start: 2019-09-19 | End: 2019-09-19 | Stop reason: SURG

## 2019-09-19 RX ORDER — ATRACURIUM BESYLATE 10 MG/ML
INJECTION, SOLUTION INTRAVENOUS AS NEEDED
Status: DISCONTINUED | OUTPATIENT
Start: 2019-09-19 | End: 2019-09-19 | Stop reason: SURG

## 2019-09-19 RX ORDER — FENTANYL CITRATE 50 UG/ML
INJECTION, SOLUTION INTRAMUSCULAR; INTRAVENOUS AS NEEDED
Status: DISCONTINUED | OUTPATIENT
Start: 2019-09-19 | End: 2019-09-19 | Stop reason: SURG

## 2019-09-19 RX ORDER — OXYCODONE AND ACETAMINOPHEN 10; 325 MG/1; MG/1
1 TABLET ORAL EVERY 4 HOURS PRN
Status: DISCONTINUED | OUTPATIENT
Start: 2019-09-19 | End: 2019-09-20 | Stop reason: HOSPADM

## 2019-09-19 RX ORDER — NEOSTIGMINE METHYLSULFATE 5 MG/5 ML
SYRINGE (ML) INTRAVENOUS AS NEEDED
Status: DISCONTINUED | OUTPATIENT
Start: 2019-09-19 | End: 2019-09-19 | Stop reason: SURG

## 2019-09-19 RX ORDER — IBUPROFEN 600 MG/1
600 TABLET ORAL EVERY 6 HOURS PRN
Status: DISCONTINUED | OUTPATIENT
Start: 2019-09-19 | End: 2019-09-20 | Stop reason: HOSPADM

## 2019-09-19 RX ORDER — MAGNESIUM HYDROXIDE 1200 MG/15ML
LIQUID ORAL AS NEEDED
Status: DISCONTINUED | OUTPATIENT
Start: 2019-09-19 | End: 2019-09-19 | Stop reason: HOSPADM

## 2019-09-19 RX ORDER — MORPHINE SULFATE 4 MG/ML
4 INJECTION, SOLUTION INTRAMUSCULAR; INTRAVENOUS ONCE
Status: DISCONTINUED | OUTPATIENT
Start: 2019-09-20 | End: 2019-09-20 | Stop reason: HOSPADM

## 2019-09-19 RX ORDER — SODIUM CHLORIDE 9 MG/ML
125 INJECTION, SOLUTION INTRAVENOUS CONTINUOUS
Status: DISCONTINUED | OUTPATIENT
Start: 2019-09-20 | End: 2019-09-20 | Stop reason: HOSPADM

## 2019-09-19 RX ORDER — SUCCINYLCHOLINE CHLORIDE 20 MG/ML
INJECTION INTRAMUSCULAR; INTRAVENOUS AS NEEDED
Status: DISCONTINUED | OUTPATIENT
Start: 2019-09-19 | End: 2019-09-19 | Stop reason: SURG

## 2019-09-19 RX ADMIN — GLYCOPYRROLATE 0.4 MG: 0.2 INJECTION, SOLUTION INTRAMUSCULAR; INTRAVENOUS at 22:11

## 2019-09-19 RX ADMIN — Medication 2 MG: at 22:11

## 2019-09-19 RX ADMIN — ONDANSETRON 4 MG: 2 INJECTION INTRAMUSCULAR; INTRAVENOUS at 16:33

## 2019-09-19 RX ADMIN — HYDROMORPHONE HYDROCHLORIDE 0.5 MG: 1 INJECTION, SOLUTION INTRAMUSCULAR; INTRAVENOUS; SUBCUTANEOUS at 22:30

## 2019-09-19 RX ADMIN — DEXAMETHASONE SODIUM PHOSPHATE 4 MG: 4 INJECTION, SOLUTION INTRAMUSCULAR; INTRAVENOUS at 20:40

## 2019-09-19 RX ADMIN — ATRACURIUM BESYLATE 15 MG: 10 INJECTION, SOLUTION INTRAVENOUS at 20:40

## 2019-09-19 RX ADMIN — MORPHINE SULFATE 4 MG: 4 INJECTION INTRAVENOUS at 16:35

## 2019-09-19 RX ADMIN — MEPERIDINE HYDROCHLORIDE 12.5 MG: 25 INJECTION INTRAMUSCULAR; INTRAVENOUS; SUBCUTANEOUS at 22:35

## 2019-09-19 RX ADMIN — ONDANSETRON 4 MG: 2 INJECTION INTRAMUSCULAR; INTRAVENOUS at 22:07

## 2019-09-19 RX ADMIN — HUMAN RHO(D) IMMUNE GLOBULIN 1500 UNITS: 1500 SOLUTION INTRAMUSCULAR; INTRAVENOUS at 19:30

## 2019-09-19 RX ADMIN — FENTANYL CITRATE 100 MCG: 50 INJECTION, SOLUTION INTRAMUSCULAR; INTRAVENOUS at 20:35

## 2019-09-19 RX ADMIN — PROPOFOL 150 MG: 10 INJECTION, EMULSION INTRAVENOUS at 20:35

## 2019-09-19 RX ADMIN — HYDROMORPHONE HYDROCHLORIDE 0.5 MG: 1 INJECTION, SOLUTION INTRAMUSCULAR; INTRAVENOUS; SUBCUTANEOUS at 23:20

## 2019-09-19 RX ADMIN — ATRACURIUM BESYLATE 10 MG: 10 INJECTION, SOLUTION INTRAVENOUS at 21:04

## 2019-09-19 RX ADMIN — SUCCINYLCHOLINE CHLORIDE 100 MG: 20 INJECTION, SOLUTION INTRAMUSCULAR; INTRAVENOUS at 20:35

## 2019-09-19 RX ADMIN — SODIUM CHLORIDE 125 ML/HR: 9 INJECTION, SOLUTION INTRAVENOUS at 23:49

## 2019-09-19 RX ADMIN — ATRACURIUM BESYLATE 10 MG: 10 INJECTION, SOLUTION INTRAVENOUS at 21:32

## 2019-09-19 RX ADMIN — SCOPALAMINE 1 PATCH: 1 PATCH, EXTENDED RELEASE TRANSDERMAL at 20:13

## 2019-09-19 RX ADMIN — LIDOCAINE HYDROCHLORIDE 50 MG: 10 INJECTION, SOLUTION INFILTRATION; PERINEURAL at 20:35

## 2019-09-19 RX ADMIN — SODIUM CHLORIDE 1000 ML: 9 INJECTION, SOLUTION INTRAVENOUS at 16:35

## 2019-09-19 RX ADMIN — SODIUM CHLORIDE 1000 ML: 9 INJECTION, SOLUTION INTRAVENOUS at 18:35

## 2019-09-19 RX ADMIN — ATRACURIUM BESYLATE 5 MG: 10 INJECTION, SOLUTION INTRAVENOUS at 20:35

## 2019-09-19 RX ADMIN — SODIUM CHLORIDE, POTASSIUM CHLORIDE, SODIUM LACTATE AND CALCIUM CHLORIDE: 600; 310; 30; 20 INJECTION, SOLUTION INTRAVENOUS at 21:15

## 2019-09-19 RX ADMIN — FAMOTIDINE 20 MG: 10 INJECTION, SOLUTION INTRAVENOUS at 20:14

## 2019-09-19 NOTE — H&P
Jennifer Cosme  : 1980  MRN: [id number]  CSN: [id number]    History and Physical    Subjective   Jennifer Cosme is a 39 y.o. year old  who presented to ED with severe RLQ pain.  LMP 29 with no missed period however HCG close to 2000 and no IUP seen on ultrasound.  RIght adnexa with complex mass c/w ectopic rupture per radiology.  Pain associated with peritoneal symptoms and vomiting.    Patient Active Problem List   Diagnosis   (none) - all problems resolved or deleted     Past Medical History:   Diagnosis Date   • Female infertility     IVF pregnancy (2nd)   • GERD (gastroesophageal reflux disease)     worse during pregancy    • Infertility, female    • Nausea and vomiting during pregnancy    • Polycystic ovary syndrome     per labs   • Rh incompatibility     took Rhogam at 28 weeks      Past Surgical History:   Procedure Laterality Date   •  SECTION N/A 2017    Procedure:  SECTION REPEAT;  Surgeon: Anya Yanez MD;  Location: [facility] LABOR DELIVERY;  Service:    •  SECTION PRIMARY     • EAR TUBES     • EYE MUSCLE SURGERY       Social History     Socioeconomic History   • Marital status:      Spouse name: Not on file   • Number of children: Not on file   • Years of education: Not on file   • Highest education level: Not on file   Tobacco Use   • Smoking status: Never Smoker   • Smokeless tobacco: Never Used   Substance and Sexual Activity   • Alcohol use: No   • Drug use: No   • Sexual activity: Yes     Birth control/protection: OCP       Current Facility-Administered Medications:   •  sodium chloride 0.9 % bolus 1,000 mL, 1,000 mL, Intravenous, Once, Anya Yanez MD, Last Rate: 2,000 mL/hr at 19 1835, 1,000 mL at 19 183  •  sodium chloride 0.9 % flush 10 mL, 10 mL, Intravenous, PRN, Nickie Wang MD    Current Outpatient Medications:   •  fluconazole (DIFLUCAN) 150 MG tablet, Take 1 tablet by mouth, may repeat in 72 hours, Disp: 2  "tablet, Rfl: 0  •  fluconazole (DIFLUCAN) 150 MG tablet, Take 1 tablet an onset of symptoms, repeat in 72 hours if needed, Disp: 2 tablet, Rfl: 1  •  ibuprofen (ADVIL,MOTRIN) 600 MG tablet, Take 1 tablet by mouth Every 6 (Six) Hours As Needed for Mild Pain (1-3)., Disp: 30 tablet, Rfl: 2  •  Lactobacillus (PROBIOTIC ACIDOPHILUS PO), Take  by mouth., Disp: , Rfl:   •  MethylPREDNISolone (MEDROL, TIARA,) 4 MG tablet, Take as directed on package instructions., Disp: 1 each, Rfl: 0  •  pseudoephedrine (SUDAFED) 60 MG tablet, Take 60 mg by mouth Every 4 (Four) Hours As Needed for Congestion., Disp: , Rfl:     No Known Allergies    Review of Systems      Objective   /74   Pulse 77   Temp 97.8 °F (36.6 °C) (Oral)   Resp 18   Ht 175.3 cm (69\")   Wt 72.6 kg (160 lb)   LMP 2019   SpO2 100%   BMI 23.63 kg/m²   General: well developed; well nourished  no acute distress   Heart: regular rate and rhythm, S1, S2 normal, no murmur, click, rub or gallop   Lungs: breathing is unlabored   Abdomen: Soft, tender with guarding no rebound   Pelvis:: Not performed.   Labs  Lab Results   Component Value Date     2019    HGB 13.0 2019    HCT 39.2 2019    WBC 11.65 (H) 2019     2019    K 4.1 2019     2019    CO2 25.0 2019    BUN 13 2019    CREATININE 0.56 (L) 2019    GLUCOSE 111 (H) 2019    ALBUMIN 4.90 2019    CALCIUM 9.0 2019    AST 13 2019    ALT 12 2019    BILITOT 0.4 2019        Assessment   1. 40 yo  with clinical presentation c/w ruptured ectopic currently hemodynamically stable but given appearance on u/s and current pain level do not feel she is a candidate for conservative management with MTX     Plan   1. Admit for laparoscopic treatment of ruptured ectopic.  This is an emergent case but OR will not be available until 8 PM at earliest.  T&S ordered.  2. Rh negative.  Will need Rhogam.    Anya BOONE" MD Renata  9/19/2019  6:42 PM

## 2019-09-19 NOTE — ED PROVIDER NOTES
Subjective   Jennifer Cosme is a 39 y.o.female who presents to the ED with complaints of right lower quadrant abdominal pain that radiates into her rectum. The patient reports her pain has been worsening since sudden onset earlier today. When her pain onset, she states it was a dull pain before it slowly became a sharp, stabbing sensation that worsens with movement. She has not taken any medication for her pain. She has also been experiencing chills, nausea, vomiting, and diarrhea. She denies any fever, dysuria, hematuria, vaginal discharge, or hematochezia. Additionally, she states her last normal period was three weeks ago, and she denies having intercourse since then. Of note, she had a positive pregnancy test while in triage today. She states she was treated for a yeast infection about two weeks ago, but she states her symptoms have subsided. There are no other complaints at this time.         History provided by:  Patient  Abdominal Pain   Pain location:  RLQ  Pain quality: dull, sharp and stabbing    Pain radiation: rectum.  Pain severity:  Moderate  Onset quality:  Sudden  Duration:  1 day  Timing:  Constant  Progression:  Unchanged  Chronicity:  New  Relieved by:  None tried  Worsened by:  Movement  Ineffective treatments:  None tried  Associated symptoms: chills, diarrhea, nausea and vomiting    Associated symptoms: no dysuria, no fever, no hematochezia, no hematuria and no vaginal discharge    Risk factors: pregnancy        Review of Systems   Constitutional: Positive for chills. Negative for fever.   Gastrointestinal: Positive for abdominal pain, diarrhea, nausea and vomiting. Negative for blood in stool and hematochezia.   Genitourinary: Negative for dysuria, hematuria and vaginal discharge.   All other systems reviewed and are negative.      Past Medical History:   Diagnosis Date   • Female infertility     IVF pregnancy (2nd)   • Infertility, female    • Nausea and vomiting during pregnancy    •  Polycystic ovary syndrome     per labs   • Rh incompatibility     took Rhogam at 28 weeks        No Known Allergies    Past Surgical History:   Procedure Laterality Date   •  SECTION N/A 2017    Procedure:  SECTION REPEAT;  Surgeon: Anya Yanez MD;  Location: [facility] LABOR DELIVERY;  Service:    •  SECTION PRIMARY     • EAR TUBES     • EYE MUSCLE SURGERY     • LAPAROSCOPIC EXPLORATION FOR ECTOPIC PREGNANCY N/A 2019    Procedure: LAPAROSCOPIC EXPLORATION FOR ECTOPIC PREGNANCY, REMOVAL OF RIGHT TUBE;  Surgeon: Anya Yanez MD;  Location: [facility] OR;  Service: Obstetrics/Gynecology       History reviewed. No pertinent family history.    Social History     Socioeconomic History   • Marital status:      Spouse name: Not on file   • Number of children: Not on file   • Years of education: Not on file   • Highest education level: Not on file   Tobacco Use   • Smoking status: Never Smoker   • Smokeless tobacco: Never Used   Substance and Sexual Activity   • Alcohol use: No   • Drug use: No   • Sexual activity: Yes     Birth control/protection: OCP         Objective   Physical Exam   Constitutional: She is oriented to person, place, and time. She appears well-developed and well-nourished.   HENT:   Head: Normocephalic and atraumatic.   Eyes: Conjunctivae are normal. No scleral icterus.   Neck: Normal range of motion. Neck supple.   Cardiovascular: Normal rate, regular rhythm, normal heart sounds and intact distal pulses.   No murmur heard.  Pulmonary/Chest: Effort normal and breath sounds normal. No respiratory distress.   Abdominal: Soft. Bowel sounds are normal. There is tenderness (moderate) in the right lower quadrant.   Negative Rovsing's sign.    Musculoskeletal: Normal range of motion. She exhibits no edema.   Neurological: She is alert and oriented to person, place, and time.   Skin: Skin is warm and dry.   Psychiatric: She has a normal mood and affect. Her behavior is  "normal.   Nursing note and vitals reviewed.      Procedures         ED Course     No results found for this or any previous visit (from the past 24 hour(s)).  Note: In addition to lab results from this visit, the labs listed above may include labs taken at another facility or during a different encounter within the last 24 hours. Please correlate lab times with ED admission and discharge times for further clarification of the services performed during this visit.    US Ob Transvaginal   Final Result   1. Heterogeneous disorganized appearance the right adnexa, potentially   hemorrhage/clot from ectopic pregnancy given patient's history. OB   evaluation is needed.   2. Uterus appears mildly enlarged but otherwise unremarkable.   3. Limited evaluation of the left ovary, grossly normal in appearance   except for a 14 mm debris-containing cyst.       Preliminary report of this exam was discussed with Dr. Dobson in the ER   at approximately 3:50 PM on 09/19/2019.        DICTATED:   09/19/2019   EDITED/ls :   09/19/2019             This report was finalized on 9/19/2019 10:17 PM by DR. Chris Villalta MD.            Vitals:    09/19/19 2315 09/19/19 2359 09/20/19 0316 09/20/19 0754   BP: 125/73 114/62 94/53 101/54   BP Location:  Right arm Right arm Right arm   Patient Position:  Lying Lying Lying   Pulse: 87 90 97 88   Resp: 16 18 18 16   Temp:  98.9 °F (37.2 °C) 98.1 °F (36.7 °C) 98.8 °F (37.1 °C)   TempSrc:  Axillary Axillary Oral   SpO2: 98% 100% 100% 98%   Weight:  72.6 kg (160 lb)     Height:  170.2 cm (67\")       Medications   sodium chloride 0.9 % bolus 1,000 mL (0 mL Intravenous Stopped 9/19/19 1705)   Morphine sulfate (PF) injection 4 mg (4 mg Intravenous Given 9/19/19 1635)   ondansetron (ZOFRAN) injection 4 mg (4 mg Intravenous Given 9/19/19 1633)   famotidine (PEPCID) injection 20 mg (20 mg Intravenous Given 9/19/19 2014)   sodium chloride 0.9 % bolus 1,000 mL (1,000 mL Intravenous New Bag 9/19/19 1835)   Rho D " Immune Globulin (RHOPHYLAC) injection 1,500 Units (1,500 Units Intramuscular Given 9/19/19 1930)     ECG/EMG Results (last 24 hours)     ** No results found for the last 24 hours. **        No orders to display                       MDM  Number of Diagnoses or Management Options  Ectopic pregnancy without intrauterine pregnancy, unspecified location: new and requires workup  Diagnosis management comments: Is consistent with ectopic pregnancy.    Dr. Ailin Call has been consulted and will take the patient to the OR for intervention.    Patient has remained stable here in the ER.       Amount and/or Complexity of Data Reviewed  Tests in the radiology section of CPT®: ordered and reviewed  Decide to obtain previous medical records or to obtain history from someone other than the patient: yes  Obtain history from someone other than the patient: yes  Review and summarize past medical records: yes  Discuss the patient with other providers: yes  Independent visualization of images, tracings, or specimens: yes        Final diagnoses:   Ectopic pregnancy without intrauterine pregnancy, unspecified location       Documentation assistance provided by sarah Taveras.  Information recorded by the sarah was done at my direction and has been verified and validated by me.     Timo Taveras  09/19/19 1556       Timo Taveras  09/19/19 1465       Timo Taveras  09/19/19 3595       Nickie Wang MD  09/25/19 8131

## 2019-09-19 NOTE — ANESTHESIA PREPROCEDURE EVALUATION
Anesthesia Evaluation     Patient summary reviewed and Nursing notes reviewed   no history of anesthetic complications:  NPO Solid Status: > 8 hours  NPO Liquid Status: > 2 hours           Airway   Mallampati: II  TM distance: >3 FB  Neck ROM: full  No difficulty expected  Dental - normal exam     Pulmonary - negative pulmonary ROS and normal exam   Cardiovascular - negative cardio ROS and normal exam    Rhythm: regular  Rate: normal        Neuro/Psych- negative ROS  GI/Hepatic/Renal/Endo - negative ROS     Musculoskeletal (-) negative ROS    Abdominal    Substance History      OB/GYN      Comment: Ectopic pregnancy      Other - negative ROS                       Anesthesia Plan    ASA 2 - emergent     general   Rapid sequence  intravenous induction   Anesthetic plan, all risks, benefits, and alternatives have been provided, discussed and informed consent has been obtained with: patient.

## 2019-09-20 VITALS
RESPIRATION RATE: 16 BRPM | DIASTOLIC BLOOD PRESSURE: 54 MMHG | BODY MASS INDEX: 25.11 KG/M2 | OXYGEN SATURATION: 98 % | HEIGHT: 67 IN | TEMPERATURE: 98.8 F | WEIGHT: 160 LBS | HEART RATE: 88 BPM | SYSTOLIC BLOOD PRESSURE: 101 MMHG

## 2019-09-20 LAB
ANION GAP SERPL CALCULATED.3IONS-SCNC: 7 MMOL/L (ref 5–15)
BASOPHILS # BLD AUTO: 0.03 10*3/MM3 (ref 0–0.2)
BASOPHILS NFR BLD AUTO: 0.2 % (ref 0–1.5)
BUN BLD-MCNC: 8 MG/DL (ref 6–20)
BUN/CREAT SERPL: 15.7 (ref 7–25)
CALCIUM SPEC-SCNC: 7.7 MG/DL (ref 8.6–10.5)
CHLORIDE SERPL-SCNC: 104 MMOL/L (ref 98–107)
CO2 SERPL-SCNC: 25 MMOL/L (ref 22–29)
CREAT BLD-MCNC: 0.51 MG/DL (ref 0.57–1)
DEPRECATED RDW RBC AUTO: 42 FL (ref 37–54)
EOSINOPHIL # BLD AUTO: 0 10*3/MM3 (ref 0–0.4)
EOSINOPHIL NFR BLD AUTO: 0 % (ref 0.3–6.2)
ERYTHROCYTE [DISTWIDTH] IN BLOOD BY AUTOMATED COUNT: 12.6 % (ref 12.3–15.4)
GFR SERPL CREATININE-BSD FRML MDRD: 136 ML/MIN/1.73
GLUCOSE BLD-MCNC: 130 MG/DL (ref 65–99)
HCT VFR BLD AUTO: 28.8 % (ref 34–46.6)
HGB BLD-MCNC: 9.3 G/DL (ref 12–15.9)
IMM GRANULOCYTES # BLD AUTO: 0.05 10*3/MM3 (ref 0–0.05)
IMM GRANULOCYTES NFR BLD AUTO: 0.4 % (ref 0–0.5)
LYMPHOCYTES # BLD AUTO: 1.43 10*3/MM3 (ref 0.7–3.1)
LYMPHOCYTES NFR BLD AUTO: 10.4 % (ref 19.6–45.3)
MCH RBC QN AUTO: 29.6 PG (ref 26.6–33)
MCHC RBC AUTO-ENTMCNC: 32.3 G/DL (ref 31.5–35.7)
MCV RBC AUTO: 91.7 FL (ref 79–97)
MONOCYTES # BLD AUTO: 1.07 10*3/MM3 (ref 0.1–0.9)
MONOCYTES NFR BLD AUTO: 7.8 % (ref 5–12)
NEUTROPHILS # BLD AUTO: 11.22 10*3/MM3 (ref 1.7–7)
NEUTROPHILS NFR BLD AUTO: 81.2 % (ref 42.7–76)
NRBC BLD AUTO-RTO: 0 /100 WBC (ref 0–0.2)
PLATELET # BLD AUTO: 229 10*3/MM3 (ref 140–450)
PMV BLD AUTO: 11.3 FL (ref 6–12)
POTASSIUM BLD-SCNC: 3.8 MMOL/L (ref 3.5–5.2)
RBC # BLD AUTO: 3.14 10*6/MM3 (ref 3.77–5.28)
SODIUM BLD-SCNC: 136 MMOL/L (ref 136–145)
WBC NRBC COR # BLD: 13.8 10*3/MM3 (ref 3.4–10.8)

## 2019-09-20 PROCEDURE — 85025 COMPLETE CBC W/AUTO DIFF WBC: CPT | Performed by: OBSTETRICS & GYNECOLOGY

## 2019-09-20 PROCEDURE — 80048 BASIC METABOLIC PNL TOTAL CA: CPT | Performed by: OBSTETRICS & GYNECOLOGY

## 2019-09-20 RX ORDER — IBUPROFEN 600 MG/1
600 TABLET ORAL EVERY 6 HOURS PRN
Qty: 25 TABLET | Refills: 2 | Status: SHIPPED | OUTPATIENT
Start: 2019-09-20

## 2019-09-20 RX ORDER — HYDROCODONE BITARTRATE AND ACETAMINOPHEN 5; 325 MG/1; MG/1
1 TABLET ORAL EVERY 4 HOURS PRN
Qty: 20 TABLET | Refills: 0 | Status: SHIPPED | OUTPATIENT
Start: 2019-09-20 | End: 2019-09-29

## 2019-09-20 RX ADMIN — HYDROCODONE BITARTRATE AND ACETAMINOPHEN 1 TABLET: 5; 325 TABLET ORAL at 03:20

## 2019-09-20 RX ADMIN — IBUPROFEN 600 MG: 600 TABLET, FILM COATED ORAL at 07:59

## 2019-09-20 RX ADMIN — HYDROCODONE BITARTRATE AND ACETAMINOPHEN 1 TABLET: 5; 325 TABLET ORAL at 09:33

## 2019-09-20 RX ADMIN — IBUPROFEN 600 MG: 600 TABLET, FILM COATED ORAL at 00:05

## 2019-09-20 NOTE — ANESTHESIA POSTPROCEDURE EVALUATION
Patient: Jennifer Cosme    Procedure Summary     Date:  09/19/19 Room / Location:   [facility] 02 /  [facility]    Anesthesia Start:  2031 Anesthesia Stop:  2223    Procedure:  LAPAROSCOPIC EXPLORATION FOR ECTOPIC PREGNANCY, REMOVAL OF RIGHT TUBE (N/A Abdomen) Diagnosis:      Surgeon:  Anya Yanez MD Provider:  Maximino Evans MD    Anesthesia Type:  general ASA Status:  2 - Emergent          Anesthesia Type: general  Last vitals  BP   118/59 (09/19/19 1955)   Temp   99.3 °F (37.4 °C) (09/19/19 1955)   Pulse   92 (09/19/19 1955)   Resp   18 (09/19/19 1428)     SpO2   100 % (09/19/19 1955)     Post Anesthesia Care and Evaluation    Patient location during evaluation: PACU  Level of consciousness: lethargic  Pain score: 3  Pain management: adequate  Airway patency: patent  Anesthetic complications: No anesthetic complications  PONV Status: none  Cardiovascular status: acceptable  Respiratory status: acceptable  Hydration status: acceptable

## 2019-09-20 NOTE — PROGRESS NOTES
Postop patient is doing well.  Incisions are clean and dry her she got bowel sounds and her hematocrit is 28.8.  We are going to allow her to be discharged.

## 2019-09-20 NOTE — DISCHARGE SUMMARY
Amol  Jennifer Cosme  : 1980  MRN: [id number]  CSN: [id number]    Discharge Summary      Date of Admission: 2019   Date of Discharge:    Discharge Diagnosis: 1.  Right tubal ectopic pregnancy.   Procedures Performed: Procedure(s):  LAPAROSCOPIC EXPLORATION FOR ECTOPIC PREGNANCY, REMOVAL OF RIGHT TUBE      Consults:  None   Brief History: Patient is a 39 y.o.who presented with abdominal pain pregnancy and ectopic pregnancy..   Hospital Course:  Patient underwent laparoscopy with right salpingectomy.   Pending Studies:  None   Condition at discharge: gradually improving   Discharge Medications:    Your medication list      START taking these medications      Instructions Last Dose Given Next Dose Due   HYDROcodone-acetaminophen 5-325 MG per tablet  Commonly known as:  NORCO      Take 1 tablet by mouth Every 4 (Four) Hours As Needed for Moderate Pain  for up to 9 days.          CHANGE how you take these medications      Instructions Last Dose Given Next Dose Due   ibuprofen 600 MG tablet  Commonly known as:  ADVIL,MOTRIN  What changed:  Another medication with the same name was added. Make sure you understand how and when to take each.      Take 1 tablet by mouth Every 6 (Six) Hours As Needed for Mild Pain (1-3).       ibuprofen 600 MG tablet  Commonly known as:  ADVIL,MOTRIN  What changed:  You were already taking a medication with the same name, and this prescription was added. Make sure you understand how and when to take each.      Take 1 tablet by mouth Every 6 (Six) Hours As Needed for Mild Pain .          CONTINUE taking these medications      Instructions Last Dose Given Next Dose Due   fish oil 1000 MG capsule capsule      Take 2,000 mg by mouth Daily With Breakfast.       LUMIFY 0.025 % solution ophthalmic solution  Generic drug:  Brimonidine Tartrate      Administer 1 drop to both eyes Daily.       methylPREDNISolone 4 MG tablet  Commonly known as:  MEDROL (TIARA)      Take as directed on  package instructions.       PRENATAL 1 PO      Take 1 tablet by mouth Daily.       PROBIOTIC ACIDOPHILUS PO      Take 1 tablet by mouth Daily.       pseudoephedrine 60 MG tablet  Commonly known as:  SUDAFED      Take 60 mg by mouth Every 4 (Four) Hours As Needed for Congestion.          STOP taking these medications    fluconazole 150 MG tablet  Commonly known as:  DIFLUCAN              Where to Get Your Medications      These medications were sent to [address] [employer] - [phone]  - [phone] [place] [address] [facility] [address]    Phone:  [phone] ·   ibuprofen 600 MG tablet     You can get these medications from any pharmacy    Bring a paper prescription for each of these medications  · HYDROcodone-acetaminophen 5-325 MG per tablet        Discharge Disposition: home   Follow-up: No future appointments.         This note has been electronically signed.    Roney Casarez MD  September 20, 2019

## 2019-09-20 NOTE — ANESTHESIA PROCEDURE NOTES
Airway  Urgency: elective    Date/Time: 9/19/2019 8:37 PM  Airway not difficult    General Information and Staff    Patient location during procedure: OR    Indications and Patient Condition  Indications for airway management: airway protection    Preoxygenated: yes  MILS not maintained throughout  Mask difficulty assessment: 1 - vent by mask    Final Airway Details  Final airway type: endotracheal airway      Successful airway: ETT  Cuffed: yes   Successful intubation technique: direct laryngoscopy  Endotracheal tube insertion site: oral  Blade: Geovani  Blade size: 3  ETT size (mm): 7.0  Cormack-Lehane Classification: grade I - full view of glottis  Placement verified by: chest auscultation and capnometry   Measured from: lips  ETT/EBT  to lips (cm): 20  Number of attempts at approach: 1    Additional Comments  Negative epigastric sounds, Breath sound equal bilaterally with symmetric chest rise and fall

## 2019-09-20 NOTE — PLAN OF CARE
Problem: Patient Care Overview  Goal: Plan of Care Review  Outcome: Ongoing (interventions implemented as appropriate)   09/20/19 0036 09/20/19 0345   Coping/Psychosocial   Plan of Care Reviewed With patient --    OTHER   Outcome Summary --  VSS with BP running in the 90s/ . UOP is excellent. Taking po medications for pain with good response. Will discontinue her ferrari this am and pending her progress, patient is expecting to be discharged today. IVF will be SL since she is tolerating po.    Plan of Care Review   Progress --  improving     Goal: Discharge Needs Assessment  Outcome: Ongoing (interventions implemented as appropriate)      Problem: Surgery Nonspecified (Adult)  Goal: Signs and Symptoms of Listed Potential Problems Will be Absent, Minimized or Managed (Surgery Nonspecified)  Outcome: Ongoing (interventions implemented as appropriate)

## 2019-09-20 NOTE — PLAN OF CARE
Problem: Patient Care Overview  Goal: Plan of Care Review  Outcome: Ongoing (interventions implemented as appropriate)   09/20/19 0036   Coping/Psychosocial   Plan of Care Reviewed With patient   OTHER   Outcome Summary Post op for ruptured ectopic pregnancy

## 2019-09-20 NOTE — OP NOTE
Operative Note:    Subjective     Date of Service:  09/19/19  Time of Service:  10:42 PM    Attending:  Surgical asst:                     Surgeon(s) and Role:     * Anya Yanez MD - Primary     * Roney Casarez MD - Assisting       Pre-operative diagnosis(es):  Ruptured right ectopic pregnancy   Post-operative diagnosis(es):  Same   Procedure(s):  Right salpingectomy with evacuation of hematoma   Antibiotics:  Not indicated     Anesthesia: Type: General  ASA:  II     Objective      Operative findings:  What appeared to be a ruptured right ectopic pregnancy into the mesosalpinx with hematoma formation involving the entire broad ligament   Specimens removed:    Fluid Intake:    Output: Documented Output  Est. Blood Loss 500 cc prior to surgery                              Less than 50 cc intraoperative blood loss  Urine Output 120 cc  I/O this shift:  In: 1500 [I.V.:1500]  Out: -        Blood products used: No   Drains: Urethral Catheter Silicone 16 Fr. (Active)          Complications:  none   Intraoperative consult(s):    Condition: stable   Disposition: to PACU and then admit to  medical - surgical floor         Procedure Note: Patient taken the operating room where adequate anesthesia was obtained she was prepped and draped in the usual sterile fashion.  A weighted a Mackay catheter was placed for bladder drainage intraoperatively and a sponge stick was placed in the vagina for uterine manipulation intraoperatively.  At this point a vertical incision was made in the umbilicus and Optiview trocar was placed on first attempt through the peritoneum with blood noted intra-abdominally at time of placement.  The abdomen was insufflated inspection showed only minimal minimal amount of free blood in the abdomen but a large clot hematoma formation around the right adnexa.  Upper abdomen appeared normal at this point a left lower quadrant port was placed under direct visualization.  A second 5 mm port was placed midline  in the previous  scar with care to avoid bladder.  At this point inspection of the hematoma showed a dilated fallopian tube involved in clot surrounding the ovary including the broad the round ligament broad ligament extending to the level of the infundibulopelvic ligament and posteriorly to the ureter.  At this point a right lower quadrant 5 mm port was placed in the with the use of the harmonic scalpel the right tube was was transected.  The mesosalpinx was opened to evacuate some of the clot and hematoma that had extended into the mesosalpinx and broad ligament.  The  10 mm  aspirator was used to remove as much of this clot is possible that was felt to be safe.  Any oozing along the cautery sites were made hemostatic with Bovie electrocautery.  The pelvis was copiously irrigated and aspirated.   It was not possible to that identify the ureter on the right due to the hematoma and generalized induration but it was not felt like this was ever in the operative field.  The left tube and ovary appeared normal.  The remaining right ovary appeared normal.  Surgicel was placed over the surgical site followed by Interceed on the anterior and posterior surface of the of the broad ligament.   At this point any remaining fluid was removed the 12 mm left lower quadrant port was closed with a Chris Larson device.  The other ports were removed and the abdomen is desufflated.  The incisions were injected with quarter percent Marcaine and closed with 3-0 Vicryl in a subcutaneous fashion.  Sterile strips and Band-Aids were placed and the patient recovered awake and stable.  All sponge instrument needle counts were correct.        Anya Yanez MD  19  10:42 PM

## 2019-09-23 LAB
CYTO UR: NORMAL
LAB AP CASE REPORT: NORMAL
LAB AP CLINICAL INFORMATION: NORMAL
PATH REPORT.FINAL DX SPEC: NORMAL
PATH REPORT.GROSS SPEC: NORMAL

## 2019-10-06 PROBLEM — J02.0 STREP PHARYNGITIS: Status: ACTIVE | Noted: 2019-10-06

## 2020-09-17 ENCOUNTER — OFFICE VISIT (OUTPATIENT)
Dept: OBSTETRICS AND GYNECOLOGY | Facility: CLINIC | Age: 40
End: 2020-09-17

## 2020-09-17 VITALS — DIASTOLIC BLOOD PRESSURE: 80 MMHG | WEIGHT: 194 LBS | BODY MASS INDEX: 30.38 KG/M2 | SYSTOLIC BLOOD PRESSURE: 112 MMHG

## 2020-09-17 DIAGNOSIS — Z31.69 PRE-CONCEPTION COUNSELING: ICD-10-CM

## 2020-09-17 DIAGNOSIS — Z01.419 WOMEN'S ANNUAL ROUTINE GYNECOLOGICAL EXAMINATION: Primary | ICD-10-CM

## 2020-09-17 DIAGNOSIS — Z71.85 HPV VACCINE COUNSELING: ICD-10-CM

## 2020-09-17 PROCEDURE — 99385 PREV VISIT NEW AGE 18-39: CPT | Performed by: OBSTETRICS & GYNECOLOGY

## 2020-09-17 NOTE — PROGRESS NOTES
GYN Annual Exam     CC - Here for annual exam.     Subjective   HPI  Jennifer Cosme is a 40 y.o. female, , who presents for annual well woman exam. Patient's last menstrual period was 09/15/2020..  Periods are regular every 25-35 days, lasting 7 days. The patient uses 1 of tampons/pads per hour., lasting 1 days.  Dysmenorrhea:none.  Patient reports problems with: none.  Partner Status: Marital Status: .  New Partners since last visit: no.  Desires STD Screening: no.  Patient has not had the HPV vaccine.  Pt was last seen in July for AUB. US normal. Denies AUB since then.    Additional OB/GYN History   Current contraception: contraceptive methods: None  Desires to: continue contraception  Last Pap :   Last Completed Pap Smear       Status Date      PAP SMEAR Done 9/10/2019 neg, neg HPV        History of abnormal Pap smear: no  Family history of uterine, colon, breast, or ovarian cancer: no  Performs monthly Self-Breast Exam: yes  Exercises Regularly:yes  Feelings of Anxiety or Depression: no  Tobacco Usage?: No   OB History        2    Para   2    Term   2       0    AB   0    Living   2       SAB   0    TAB   0    Ectopic   0    Molar        Multiple   0    Live Births   2                Health Maintenance   Topic Date Due   • Annual Gynecologic Pelvic and Breast Exam  1980   • ANNUAL PHYSICAL  1985   • TDAP/TD VACCINES (1 - Tdap) 2001   • HEPATITIS C SCREENING  2017   • INFLUENZA VACCINE  2020   • PAP SMEAR  09/10/2022   • BH AMB Pneumococcal Vaccine 65+ (1 of 1 - PPSV23) 2047   • BH AMB Pneumococcal Vaccine 0-64  Aged Out       The additional following portions of the patient's history were reviewed and updated as appropriate: allergies, current medications, past family history, past medical history, past social history, past surgical history and problem list.    Review of Systems   Constitutional: Negative.    HENT: Negative.    Eyes: Negative.     Respiratory: Negative.    Cardiovascular: Negative.    Gastrointestinal: Negative.    Endocrine: Negative.    Genitourinary: Negative.    Musculoskeletal: Negative.    Skin: Negative.    Allergic/Immunologic: Negative.    Neurological: Negative.    Hematological: Negative.    Psychiatric/Behavioral: Negative.        I have reviewed and agree with the HPI, ROS, and historical information as entered above. Anya Yanez MD    Objective   /80   Wt 88 kg (194 lb)   LMP 09/15/2020   BMI 30.38 kg/m²     Physical Exam  Vitals signs and nursing note reviewed. Exam conducted with a chaperone present.   Constitutional:       Appearance: She is well-developed.   HENT:      Head: Normocephalic and atraumatic.   Neck:      Musculoskeletal: Normal range of motion. No muscular tenderness.      Thyroid: No thyroid mass or thyromegaly.   Cardiovascular:      Rate and Rhythm: Normal rate and regular rhythm.      Heart sounds: No murmur.   Pulmonary:      Effort: Pulmonary effort is normal. No retractions.      Breath sounds: Normal breath sounds. No wheezing, rhonchi or rales.   Chest:      Chest wall: No mass or tenderness.      Breasts:         Right: Normal. No mass, nipple discharge, skin change or tenderness.         Left: Normal. No mass, nipple discharge, skin change or tenderness.   Abdominal:      General: Bowel sounds are normal.      Palpations: Abdomen is soft. Abdomen is not rigid. There is no mass.      Tenderness: There is no abdominal tenderness. There is no guarding.      Hernia: No hernia is present. There is no hernia in the left inguinal area.   Genitourinary:     Labia:         Right: No rash, tenderness or lesion.         Left: No rash, tenderness or lesion.       Vagina: Normal. No vaginal discharge or lesions.      Cervix: No cervical motion tenderness, discharge, lesion or cervical bleeding.      Uterus: Normal. Not enlarged, not fixed and not tender.       Adnexa:         Right: No mass or  tenderness.          Left: No mass or tenderness.        Rectum: No external hemorrhoid.   Neurological:      Mental Status: She is alert and oriented to person, place, and time.   Psychiatric:         Behavior: Behavior normal.         Assessment/Plan         Problem List Items Addressed This Visit     None      Visit Diagnoses     Women's annual routine gynecological examination    -  Primary    HPV vaccine counseling        Pre-conception counseling              1. GYN annual well woman exam.     Plan     1. Recommended use of Vitamin D replacement and getting adequate calcium in her diet. (1500mg)  2. Reviewed monthly self breast exams.  Instructed to call with lumps, pain, or breast discharge.    3. Reviewed HPV guidelines.  Encouraged consideration of HPV vaccine if she has not received it.  4. AUB - resolved  5. She has frozen embryos.  She is undecided if she wants more children.  Encouraged folic acid just in case they decide to attempt another pregnancy.      Anya Yanez MD  09/17/2020

## 2021-02-16 RX ORDER — FLUCONAZOLE 100 MG/1
TABLET ORAL
Qty: 14 TABLET | Refills: 0 | OUTPATIENT
Start: 2021-02-16

## 2021-02-17 RX ORDER — FLUCONAZOLE 100 MG/1
TABLET ORAL
Qty: 14 TABLET | Refills: 0 | OUTPATIENT
Start: 2021-02-17

## 2021-02-19 ENCOUNTER — TELEPHONE (OUTPATIENT)
Dept: OBSTETRICS AND GYNECOLOGY | Facility: CLINIC | Age: 41
End: 2021-02-19

## 2021-09-28 ENCOUNTER — OFFICE VISIT (OUTPATIENT)
Dept: OBSTETRICS AND GYNECOLOGY | Facility: CLINIC | Age: 41
End: 2021-09-28

## 2021-09-28 VITALS — SYSTOLIC BLOOD PRESSURE: 112 MMHG | DIASTOLIC BLOOD PRESSURE: 50 MMHG | WEIGHT: 204.6 LBS | BODY MASS INDEX: 32.04 KG/M2

## 2021-09-28 DIAGNOSIS — N92.0 MENORRHAGIA WITH REGULAR CYCLE: ICD-10-CM

## 2021-09-28 DIAGNOSIS — Z00.00 ANNUAL PHYSICAL EXAM: Primary | ICD-10-CM

## 2021-09-28 DIAGNOSIS — E66.9 OBESITY (BMI 30-39.9): ICD-10-CM

## 2021-09-28 PROCEDURE — 99395 PREV VISIT EST AGE 18-39: CPT | Performed by: NURSE PRACTITIONER

## 2021-09-28 RX ORDER — FLUCONAZOLE 150 MG/1
TABLET ORAL
Qty: 2 TABLET | Refills: 2 | Status: SHIPPED | OUTPATIENT
Start: 2021-09-28

## 2021-09-28 NOTE — PROGRESS NOTES
GYN Annual Exam     CC - Here for annual exam.        HPI  Jennifer Cosme is a 41 y.o. female, , who presents for annual well woman exam. Patient's last menstrual period was 2021 (exact date)..  Periods are regular every 25-35 days, lasting 7 days.  She reports that periods seem to be heavier in flow than they used to be.  Dysmenorrhea:mild to moderate, occurring premenstrually and first day of flow.  Patient reports problems with: heavy periods.  There were no changes to her medical or surgical history since her last visit.. Partner Status: Marital Status: .  She is sexually active. She has not had new partners since her last STD testing. She does not desire STD testing.      Pt reports over the last year her periods have become heavier, mostly on the first day of her cycle. She reports she has to wear a tampon and pad, and change her tampon approx every 3 hrs. She had blood work recently with her PCP, CBC and TSH normal.     Pt requests to have a refill of diflucan. She states she gets approx 1-2 yeast infections a year, and would like to have it on hand.     Additional OB/GYN History   Current contraception: contraceptive methods: None  Desires to: do not start contraception-  planning vastectomy  Last Pap :   Last Completed Pap Smear          PAP SMEAR (Every 3 Years) Next due on 9/10/2022    09/10/2019  Done - neg, neg HPV              History of abnormal Pap smear: no  Family history of uterine, colon, breast, or ovarian cancer: no  Performs monthly Self-Breast Exam: no  Exercises Regularly:no  Feelings of Anxiety or Depression: yes - mild anxiety and depression  Tobacco Usage?: No   OB History        3    Para   2    Term   2       0    AB   1    Living   2       SAB   0    TAB   0    Ectopic   0    Molar        Multiple   0    Live Births   2                Health Maintenance   Topic Date Due   • HEPATITIS C SCREENING  Never done   • Annual Gynecologic Pelvic and  Breast Exam  09/18/2021   • INFLUENZA VACCINE  10/01/2021   • PAP SMEAR  09/10/2022   • ANNUAL PHYSICAL  09/29/2022   • TDAP/TD VACCINES (2 - Td or Tdap) 02/07/2027   • COVID-19 Vaccine  Completed   • Pneumococcal Vaccine 0-64  Aged Out       The additional following portions of the patient's history were reviewed and updated as appropriate: allergies, current medications, past family history, past medical history, past social history, past surgical history and problem list.    Review of Systems   Constitutional: Negative.    HENT: Negative.    Eyes: Negative.    Respiratory: Negative.    Cardiovascular: Negative.    Gastrointestinal: Negative.    Endocrine: Negative.    Genitourinary: Positive for menstrual problem (increased flow).   Musculoskeletal: Negative.    Skin: Negative.    Allergic/Immunologic: Negative.    Neurological: Negative.    Hematological: Negative.    Psychiatric/Behavioral: Positive for depressed mood. The patient is nervous/anxious (mild).          I have reviewed and agree with the HPI, ROS, and historical information as entered above. Leticia Baldwin, APRN    Objective   /50   Wt 92.8 kg (204 lb 9.6 oz)   LMP 09/18/2021 (Exact Date)   Breastfeeding No   BMI 32.04 kg/m²     Physical Exam  Vitals and nursing note reviewed. Exam conducted with a chaperone present.   Constitutional:       Appearance: She is well-developed.   HENT:      Head: Normocephalic and atraumatic.   Neck:      Thyroid: No thyroid mass or thyromegaly.   Cardiovascular:      Rate and Rhythm: Normal rate and regular rhythm.      Heart sounds: No murmur heard.     Pulmonary:      Effort: Pulmonary effort is normal. No retractions.      Breath sounds: Normal breath sounds. No wheezing, rhonchi or rales.   Chest:      Chest wall: No mass or tenderness.      Breasts:         Right: Normal. No mass, nipple discharge, skin change or tenderness.         Left: Normal. No mass, nipple discharge, skin change or tenderness.    Abdominal:      General: Bowel sounds are normal.      Palpations: Abdomen is soft. Abdomen is not rigid. There is no mass.      Tenderness: There is no abdominal tenderness. There is no guarding.      Hernia: No hernia is present. There is no hernia in the left inguinal area.   Genitourinary:     Labia:         Right: No rash, tenderness or lesion.         Left: No rash, tenderness or lesion.       Vagina: Normal. No vaginal discharge or lesions.      Cervix: No cervical motion tenderness, discharge, lesion or cervical bleeding.      Uterus: Normal. Not enlarged, not fixed and not tender.       Adnexa:         Right: No mass or tenderness.          Left: No mass or tenderness.        Rectum: No external hemorrhoid.   Musculoskeletal:      Cervical back: Normal range of motion. No muscular tenderness.   Neurological:      Mental Status: She is alert and oriented to person, place, and time.   Psychiatric:         Behavior: Behavior normal.            Assessment and Plan    Problem List Items Addressed This Visit     None      Visit Diagnoses     Annual physical exam    -  Primary    Obesity (BMI 30-39.9)        Menorrhagia with regular cycle        Relevant Orders    US Non-ob Transvaginal          1. GYN annual well woman exam.   2. Reviewed monthly self breast exams.  Instructed to call with lumps, pain, or breast discharge.    3. Reviewed BMI and weight loss as preventative health measures.   4. Other: Reviewed pap guidelines, and recommended the Gardasil vaccine. Next pap due 2022.   Return for RTC for pelvic U/S to eval menorrhagia. Recent labs with PCP normal.       RENEE Vo  09/28/2021

## 2022-11-17 ENCOUNTER — OFFICE VISIT (OUTPATIENT)
Dept: OBSTETRICS AND GYNECOLOGY | Facility: CLINIC | Age: 42
End: 2022-11-17

## 2022-11-17 VITALS
WEIGHT: 195.4 LBS | SYSTOLIC BLOOD PRESSURE: 114 MMHG | DIASTOLIC BLOOD PRESSURE: 78 MMHG | BODY MASS INDEX: 30.67 KG/M2 | HEIGHT: 67 IN

## 2022-11-17 DIAGNOSIS — Z01.419 WOMEN'S ANNUAL ROUTINE GYNECOLOGICAL EXAMINATION: Primary | ICD-10-CM

## 2022-11-17 DIAGNOSIS — Z12.39 ENCOUNTER FOR BREAST CANCER SCREENING USING NON-MAMMOGRAM MODALITY: ICD-10-CM

## 2022-11-17 PROCEDURE — 99396 PREV VISIT EST AGE 40-64: CPT | Performed by: OBSTETRICS & GYNECOLOGY

## 2022-11-17 RX ORDER — ERGOCALCIFEROL 1.25 MG/1
CAPSULE ORAL DAILY
COMMUNITY

## 2022-11-17 RX ORDER — FLUCONAZOLE 150 MG/1
150 TABLET ORAL DAILY
Qty: 2 TABLET | Refills: 2 | Status: SHIPPED | OUTPATIENT
Start: 2022-11-17

## 2022-11-17 NOTE — PROGRESS NOTES
Gynecologic Annual Exam Note          GYN Annual Exam     Gynecologic Exam (annual)        Subjective     HPI  Jennifer Cosme is a 42 y.o. female, , who presents for annual well woman exam as a established patient . Patient's last menstrual period was 2022..  Periods are regular every 25-35 days, lasting 7 days. The flow is moderate. Dysmenorrhea:none. . Patient reports problems with: none.  Partner Status: Marital Status: . She is is sexually active. She has not had new partners.. STD testing recommendations have been explained to the patient and she does not desire STD testing. There were no changes to her medical or surgical history since her last visit..       Additional OB/GYN History   Current contraception: contraceptive methods: None  Desires to:  is getting a vasectomy  For contraception    Last Pap : 09/10/19. Result: negative. HPV: negative  Last Completed Pap Smear     This patient has no relevant Health Maintenance data.        History of abnormal Pap smear: no  Family history of uterine, colon, breast, or ovarian cancer: no  Performs monthly Self-Breast Exam: occ  Last mammogram: none     Last Completed Mammogram     This patient has no relevant Health Maintenance data.          History of abnormal mammogram: N/A    Colonoscopy: has never had a colonoscopy.  Exercises Regularly: no  Feelings of Anxiety or Depression: yes - Anxiety-managing  Tobacco Usage?: No       Current Outpatient Medications:   •  ibuprofen (ADVIL,MOTRIN) 600 MG tablet, Take 1 tablet by mouth Every 6 (Six) Hours As Needed for Mild Pain ., Disp: 25 tablet, Rfl: 2  •  Magnesium 400 MG capsule, Take  by mouth., Disp: , Rfl:   •  Vitamin D, Ergocalciferol, 34140 units capsule, Take  by mouth Daily., Disp: , Rfl:   •  fluconazole (Diflucan) 150 MG tablet, Take one tablet po once now and repeat in 3 days, Disp: 2 tablet, Rfl: 2     Patient is requesting refills of Diflucan.    OB History        4     Para   2    Term   2       0    AB   2    Living   2       SAB   0    IAB   0    Ectopic   1    Molar        Multiple   0    Live Births   2                Past Medical History:   Diagnosis Date   • Ectopic pregnancy    • Female infertility     IVF pregnancy (2nd)   • Infertility, female    • Nausea and vomiting during pregnancy    • pap smear 09/10/2019    Pap w/ HPV regardless- negative   • Polycystic ovary syndrome     per labs   • Rh incompatibility     took Rhogam at 28 weeks    • Screening breast examination     SELF        Past Surgical History:   Procedure Laterality Date   •  SECTION N/A 2017    Procedure:  SECTION REPEAT;  Surgeon: Anya Yanez MD;  Location:  [facility] DELIV[facility];  Service:    •  SECTION PRIMARY     • EAR TUBES     • EYE MUSCLE SURGERY     • LAPAROSCOPIC EXPLORATION FOR ECTOPIC PREGNANCY N/A 2019    Procedure: LAPAROSCOPIC EXPLORATION FOR ECTOPIC PREGNANCY, REMOVAL OF RIGHT TUBE;  Surgeon: Anya Yanez MD;  Location:  [facility];  Service: Obstetrics/Gynecology       Health Maintenance   Topic Date Due   • MAMMOGRAM  Never done   • HEPATITIS C SCREENING  Never done   • COVID-19 Vaccine (3 - Booster) 03/15/2021   • Annual Gynecologic Pelvic and Breast Exam  2021   • PAP SMEAR  09/10/2022   • ANNUAL PHYSICAL  2022   • TDAP/TD VACCINES (2 - Td or Tdap) 2027   • INFLUENZA VACCINE  Completed   • Pneumococcal Vaccine 0-64  Aged Out       The additional following portions of the patient's history were reviewed and updated as appropriate: allergies, current medications, past family history, past medical history, past social history, past surgical history and problem list.    Review of Systems   Constitutional: Negative.    HENT: Negative.    Eyes: Negative.    Respiratory: Negative.    Cardiovascular: Negative.    Gastrointestinal: Negative.    Endocrine: Negative.    Genitourinary: Negative.    Musculoskeletal: Negative.    Skin:  "Negative.    Allergic/Immunologic: Negative.    Neurological: Negative.    Hematological: Negative.    Psychiatric/Behavioral: Negative.          I have reviewed and agree with the HPI, ROS, and historical information as entered above. Anya Yanez MD      Objective   /78   Ht 170.2 cm (67\")   Wt 88.6 kg (195 lb 6.4 oz)   LMP 11/02/2022   BMI 30.60 kg/m²     Physical Exam  Vitals and nursing note reviewed. Exam conducted with a chaperone present.   Constitutional:       Appearance: She is well-developed.   HENT:      Head: Normocephalic and atraumatic.   Neck:      Thyroid: No thyroid mass or thyromegaly.   Cardiovascular:      Rate and Rhythm: Normal rate and regular rhythm.      Heart sounds: No murmur heard.  Pulmonary:      Effort: Pulmonary effort is normal. No retractions.      Breath sounds: Normal breath sounds. No wheezing, rhonchi or rales.   Chest:      Chest wall: No mass or tenderness.   Breasts:     Right: Normal. No mass, nipple discharge, skin change or tenderness.      Left: Normal. No mass, nipple discharge, skin change or tenderness.   Abdominal:      General: Bowel sounds are normal.      Palpations: Abdomen is soft. Abdomen is not rigid. There is no mass.      Tenderness: There is no abdominal tenderness. There is no guarding.      Hernia: No hernia is present. There is no hernia in the left inguinal area.   Genitourinary:     Labia:         Right: No rash, tenderness or lesion.         Left: No rash, tenderness or lesion.       Vagina: Normal. No vaginal discharge or lesions.      Cervix: No cervical motion tenderness, discharge, lesion or cervical bleeding.      Uterus: Normal. Not enlarged, not fixed and not tender.       Adnexa:         Right: No mass or tenderness.          Left: No mass or tenderness.        Rectum: No external hemorrhoid.   Musculoskeletal:      Cervical back: Normal range of motion. No muscular tenderness.   Neurological:      Mental Status: She is alert and " oriented to person, place, and time.   Psychiatric:         Behavior: Behavior normal.            Assessment and Plan    Problem List Items Addressed This Visit    None  Visit Diagnoses     Women's annual routine gynecological examination    -  Primary    Relevant Orders    LIQUID-BASED PAP SMEAR, [facility] ([facility])    Encounter for breast cancer screening using non-mammogram modality              1. GYN annual well woman exam.   2. Pap guidelines reviewed.  3. Reviewed monthly self breast exams.  Instructed to call with lumps, pain, or breast discharge.    4. Start mammograms.   Mammogram ordered today  5. Recommended use of Vitamin D replacement and getting adequate calcium in her diet. (1500mg)  6. Return in about 1 year (around 11/17/2023), or if symptoms worsen or fail to improve.     Anya Yanez MD  11/17/2022

## 2022-11-22 LAB — REF LAB TEST METHOD: NORMAL

## 2023-01-30 ENCOUNTER — HOSPITAL ENCOUNTER (OUTPATIENT)
Dept: MAMMOGRAPHY | Facility: HOSPITAL | Age: 43
Discharge: HOME | End: 2023-01-30
Admitting: OBSTETRICS & GYNECOLOGY
Payer: COMMERCIAL

## 2023-01-30 DIAGNOSIS — Z12.39 ENCOUNTER FOR BREAST CANCER SCREENING USING NON-MAMMOGRAM MODALITY: ICD-10-CM

## 2023-01-30 PROCEDURE — 77063 BREAST TOMOSYNTHESIS BI: CPT

## 2023-01-30 PROCEDURE — 77067 SCR MAMMO BI INCL CAD: CPT | Performed by: RADIOLOGY

## 2023-01-30 PROCEDURE — 77067 SCR MAMMO BI INCL CAD: CPT

## 2023-01-30 PROCEDURE — 77063 BREAST TOMOSYNTHESIS BI: CPT | Performed by: RADIOLOGY

## 2023-03-24 ENCOUNTER — HOSPITAL ENCOUNTER (OUTPATIENT)
Dept: ULTRASOUND IMAGING | Facility: HOSPITAL | Age: 43
Discharge: HOME | End: 2023-03-24
Payer: COMMERCIAL

## 2023-03-24 ENCOUNTER — HOSPITAL ENCOUNTER (OUTPATIENT)
Dept: MAMMOGRAPHY | Facility: HOSPITAL | Age: 43
Discharge: HOME | End: 2023-03-24
Payer: COMMERCIAL

## 2023-03-24 ENCOUNTER — TRANSCRIBE ORDERS (OUTPATIENT)
Dept: MAMMOGRAPHY | Facility: HOSPITAL | Age: 43
End: 2023-03-24
Payer: COMMERCIAL

## 2023-03-24 DIAGNOSIS — R92.8 ABNORMAL MAMMOGRAM: ICD-10-CM

## 2023-03-24 DIAGNOSIS — R92.8 ABNORMAL MAMMOGRAM: Primary | ICD-10-CM

## 2023-03-24 PROCEDURE — G0279 TOMOSYNTHESIS, MAMMO: HCPCS

## 2023-03-24 PROCEDURE — 77066 DX MAMMO INCL CAD BI: CPT | Performed by: RADIOLOGY

## 2023-03-24 PROCEDURE — 77062 BREAST TOMOSYNTHESIS BI: CPT | Performed by: RADIOLOGY

## 2023-03-24 PROCEDURE — 76642 ULTRASOUND BREAST LIMITED: CPT | Performed by: RADIOLOGY

## 2023-03-24 PROCEDURE — 77066 DX MAMMO INCL CAD BI: CPT

## 2023-03-24 PROCEDURE — 76642 ULTRASOUND BREAST LIMITED: CPT

## 2023-10-10 ENCOUNTER — TRANSCRIBE ORDERS (OUTPATIENT)
Dept: ADMINISTRATIVE | Facility: HOSPITAL | Age: 43
End: 2023-10-10
Payer: COMMERCIAL

## 2023-10-10 ENCOUNTER — HOSPITAL ENCOUNTER (OUTPATIENT)
Dept: MAMMOGRAPHY | Facility: HOSPITAL | Age: 43
Discharge: HOME | End: 2023-10-10
Admitting: OBSTETRICS & GYNECOLOGY
Payer: COMMERCIAL

## 2023-10-10 DIAGNOSIS — R92.8 ABNORMAL MAMMOGRAM: ICD-10-CM

## 2023-10-10 DIAGNOSIS — R92.8 ABNORMAL MAMMOGRAM: Primary | ICD-10-CM

## 2023-10-10 PROCEDURE — 77066 DX MAMMO INCL CAD BI: CPT

## 2023-10-10 PROCEDURE — G0279 TOMOSYNTHESIS, MAMMO: HCPCS

## 2023-10-10 PROCEDURE — 77062 BREAST TOMOSYNTHESIS BI: CPT | Performed by: RADIOLOGY

## 2023-10-10 PROCEDURE — 77066 DX MAMMO INCL CAD BI: CPT | Performed by: RADIOLOGY

## 2024-01-16 ENCOUNTER — OFFICE VISIT (OUTPATIENT)
Dept: OBSTETRICS AND GYNECOLOGY | Facility: CLINIC | Age: 44
End: 2024-01-16
Payer: COMMERCIAL

## 2024-01-16 VITALS — WEIGHT: 198 LBS | SYSTOLIC BLOOD PRESSURE: 118 MMHG | BODY MASS INDEX: 31.01 KG/M2 | DIASTOLIC BLOOD PRESSURE: 80 MMHG

## 2024-01-16 DIAGNOSIS — B37.31 RECURRENT CANDIDIASIS OF VAGINA: ICD-10-CM

## 2024-01-16 DIAGNOSIS — Z01.419 ROUTINE GYNECOLOGICAL EXAMINATION: Primary | ICD-10-CM

## 2024-01-16 PROCEDURE — 99396 PREV VISIT EST AGE 40-64: CPT | Performed by: NURSE PRACTITIONER

## 2024-01-16 RX ORDER — METFORMIN HYDROCHLORIDE 500 MG/1
500 TABLET, EXTENDED RELEASE ORAL
COMMUNITY
Start: 2023-11-15

## 2024-01-16 RX ORDER — FLUCONAZOLE 150 MG/1
150 TABLET ORAL DAILY
Qty: 2 TABLET | Refills: 2 | Status: SHIPPED | OUTPATIENT
Start: 2024-01-16

## 2024-01-16 NOTE — PROGRESS NOTES
Gynecologic Annual Exam Note          GYN Annual Exam     Gynecologic Exam        Subjective     HPI  Jennifer Cosme is a 43 y.o. female, , who presents for annual well woman exam as a established patient. Patient's last menstrual period was 2024 (exact date).  Patient reports problems with:  long periods (10-12 days) .  Her periods occur every 28-30 days, lasting >7 days. The flow is moderate. She reports dysmenorrhea is none.     Partner Status: Marital Status: . She is sexually active. She has not had new partners. STD testing recommendations have been explained to the patient and she does not desire STD testing. There were no changes to her medical or surgical history since her last visit.       Additional OB/GYN History   Current contraception: contraceptive methods: None  Desires to: do not start contraception    Last Pap : 2022. Result: negative. HPV: negative.   Last Completed Pap Smear            PAP SMEAR (Every 3 Years) Next due on 2025  LIQUID-BASED PAP SMEAR, [facility] ([facility])    09/10/2019  Done - neg, neg HPV                  History of abnormal Pap smear: no  Family history of uterine, colon, breast, or ovarian cancer: no  Performs monthly Self-Breast Exam: no  Last mammogram: 10/10/23. Done at .    Last Completed Mammogram            Scheduled - MAMMOGRAM (Yearly) Scheduled for 2024      10/10/2023  Mammo Diagnostic Digital Tomosynthesis Bilateral With CAD    2023  Mammo Diagnostic Digital Tomosynthesis Bilateral With CAD    2023  Mammo Screening Digital Tomosynthesis Bilateral With CAD                    History of abnormal mammogram:  No, cystic breast tissue    Colonoscopy: has never had a colonoscopy.  Exercises Regularly: no  Feelings of Anxiety or Depression: yes - will talk to PCP about it  Tobacco Usage?: No       Current Outpatient Medications:     ibuprofen (ADVIL,MOTRIN) 600 MG tablet, Take 1 tablet by mouth  Every 6 (Six) Hours As Needed for Mild Pain ., Disp: 25 tablet, Rfl: 2    Magnesium 400 MG capsule, Take  by mouth., Disp: , Rfl:     metFORMIN ER (GLUCOPHAGE-XR) 500 MG 24 hr tablet, Take 1 tablet by mouth., Disp: , Rfl:     Vitamin D, Ergocalciferol, 05167 units capsule, Take  by mouth Daily., Disp: , Rfl:     fluconazole (Diflucan) 150 MG tablet, Take 1 tablet by mouth Daily. Repeat dose in 3 days, Disp: 2 tablet, Rfl: 2     Patient denies the need for medication refills today.    OB History          4    Para   2    Term   2       0    AB   2    Living   2         SAB   0    IAB   0    Ectopic   1    Molar        Multiple   0    Live Births   2                Past Medical History:   Diagnosis Date    Ectopic pregnancy     Female infertility     IVF pregnancy (2nd)    pap smear 09/10/2019    Pap w/ HPV regardless- negative    Polycystic ovary syndrome     per labs    Rh incompatibility         Past Surgical History:   Procedure Laterality Date     SECTION N/A 2017    Procedure:  SECTION REPEAT;  Surgeon: Anya Yanez MD;  Location:  [facility]IV[facility];  Service:      SECTION PRIMARY      EAR TUBES      EYE MUSCLE SURGERY      LAPAROSCOPIC EXPLORATION FOR ECTOPIC PREGNANCY N/A 2019    Procedure: LAPAROSCOPIC EXPLORATION FOR ECTOPIC PREGNANCY, REMOVAL OF RIGHT TUBE;  Surgeon: Anya Yanez MD;  Location: [facility] OR;  Service: Obstetrics/Gynecology       Health Maintenance   Topic Date Due    COLORECTAL CANCER SCREENING  Never done    HEPATITIS C SCREENING  Never done    ANNUAL PHYSICAL  2022    COVID-19 Vaccine (2023-24 season) 2023    MAMMOGRAM  10/10/2024    Annual Gynecologic Pelvic and Breast Exam  2025    PAP SMEAR  2025    TDAP/TD VACCINES (3 - Td or Tdap) 2033    INFLUENZA VACCINE  Completed    Pneumococcal Vaccine 0-64  Aged Out       The additional following portions of the patient's history were reviewed and updated  as appropriate: allergies, current medications, past family history, past medical history, past social history, past surgical history, and problem list.    Review of Systems   Constitutional: Negative.    HENT: Negative.     Eyes: Negative.    Respiratory: Negative.     Cardiovascular: Negative.    Gastrointestinal: Negative.    Endocrine: Negative.    Genitourinary: Negative.    Musculoskeletal: Negative.    Skin: Negative.    Allergic/Immunologic: Negative.    Neurological: Negative.    Hematological: Negative.    Psychiatric/Behavioral: Negative.     All other systems reviewed and are negative.        I have reviewed and agree with the HPI, ROS, and historical information as entered above. Gilberto HURTADO Jakob, APRN          Objective   /80   Wt 89.8 kg (198 lb)   LMP 01/03/2024 (Exact Date)   BMI 31.01 kg/m²     Physical Exam  Vitals and nursing note reviewed. Exam conducted with a chaperone present.   Constitutional:       Appearance: Normal appearance. She is well-developed.   HENT:      Head: Normocephalic and atraumatic.   Neck:      Thyroid: No thyroid mass or thyromegaly.   Cardiovascular:      Rate and Rhythm: Normal rate.      Heart sounds: No murmur heard.  Pulmonary:      Effort: Pulmonary effort is normal. No retractions.      Breath sounds: No wheezing, rhonchi or rales.   Chest:      Chest wall: No mass or tenderness.   Breasts:     Right: Normal. No mass, nipple discharge, skin change or tenderness.      Left: Normal. No mass, nipple discharge, skin change or tenderness.   Abdominal:      Palpations: Abdomen is soft. Abdomen is not rigid. There is no mass.      Tenderness: There is no abdominal tenderness. There is no guarding.      Hernia: No hernia is present.   Genitourinary:     General: Normal vulva.      Exam position: Lithotomy position.      Labia:         Right: No rash, tenderness or lesion.         Left: No rash, tenderness or lesion.       Vagina: Normal. No vaginal discharge  or lesions.      Cervix: No cervical motion tenderness, discharge, lesion or cervical bleeding.      Uterus: Normal. Not enlarged, not fixed and not tender.       Adnexa: Right adnexa normal and left adnexa normal.        Right: No mass or tenderness.          Left: No mass or tenderness.        Rectum: Normal. No external hemorrhoid.   Musculoskeletal:      Cervical back: Normal range of motion. No muscular tenderness.   Neurological:      Mental Status: She is alert and oriented to person, place, and time.   Psychiatric:         Behavior: Behavior normal.            Assessment and Plan    Problem List Items Addressed This Visit    None  Visit Diagnoses       Routine gynecological examination    -  Primary    Recurrent candidiasis of vagina        Relevant Medications    fluconazole (Diflucan) 150 MG tablet            GYN annual well woman exam.   Pap guidelines reviewed. Pap not due this year.  Pt reports periods are becoming longer than usual sometimes lasting 11 days. She says the flow is really light at the end. We discussed using birth control to manage this but she is not interested at this time.  Fibrocystic breast changes - Encouraged decreasing caffeine, supportive bra, low dose vitamin E supplementation.  Reviewed monthly self breast exams.  Instructed to call with lumps, pain, or breast discharge.    Recommended use of Vitamin D replacement and getting adequate calcium in her diet. (1500mg)  Reviewed exercise as a preventative health measures.   Reccommended Flu Vaccine in Fall of each year.  Symptoms of menopausal transition reviewed with patient.   RTC in 1 year or PRN with problems.  Recurrent candidal infections but she is not symptomatic today. Has been given Rx for diflucan with refills in the past to be able to treat when symptomatic. Diflucan Erx sent with 2 refills.    Gilberto Robert, APRN  01/16/2024

## 2024-04-24 ENCOUNTER — HOSPITAL ENCOUNTER (OUTPATIENT)
Dept: MAMMOGRAPHY | Facility: HOSPITAL | Age: 44
Discharge: HOME | End: 2024-04-24
Admitting: OBSTETRICS & GYNECOLOGY
Payer: COMMERCIAL

## 2024-04-24 DIAGNOSIS — R92.8 ABNORMAL MAMMOGRAM: ICD-10-CM

## 2024-04-24 PROCEDURE — G0279 TOMOSYNTHESIS, MAMMO: HCPCS

## 2024-04-24 PROCEDURE — 77066 DX MAMMO INCL CAD BI: CPT

## 2024-05-15 ENCOUNTER — TRANSCRIBE ORDERS (OUTPATIENT)
Dept: MAMMOGRAPHY | Facility: HOSPITAL | Age: 44
End: 2024-05-15
Payer: COMMERCIAL

## 2024-05-15 DIAGNOSIS — R92.8 ABNORMAL MAMMOGRAM: Primary | ICD-10-CM

## 2025-01-28 ENCOUNTER — OFFICE VISIT (OUTPATIENT)
Dept: OBSTETRICS AND GYNECOLOGY | Facility: CLINIC | Age: 45
End: 2025-01-28
Payer: COMMERCIAL

## 2025-01-28 VITALS
SYSTOLIC BLOOD PRESSURE: 118 MMHG | BODY MASS INDEX: 30.13 KG/M2 | WEIGHT: 192 LBS | DIASTOLIC BLOOD PRESSURE: 78 MMHG | HEIGHT: 67 IN

## 2025-01-28 DIAGNOSIS — Z01.419 WOMEN'S ANNUAL ROUTINE GYNECOLOGICAL EXAMINATION: ICD-10-CM

## 2025-01-28 DIAGNOSIS — N93.9 ABNORMAL UTERINE BLEEDING (AUB): ICD-10-CM

## 2025-01-28 DIAGNOSIS — Z12.39 ENCOUNTER FOR BREAST CANCER SCREENING USING NON-MAMMOGRAM MODALITY: ICD-10-CM

## 2025-01-28 DIAGNOSIS — R92.30 DENSE BREAST: Primary | ICD-10-CM

## 2025-01-28 RX ORDER — FAMOTIDINE 20 MG/1
20 TABLET, FILM COATED ORAL 2 TIMES DAILY
COMMUNITY

## 2025-01-28 RX ORDER — TRETINOIN 0.25 MG/G
1 CREAM TOPICAL NIGHTLY
COMMUNITY
Start: 2024-08-23

## 2025-01-28 NOTE — PROGRESS NOTES
Gynecologic Annual Exam Note          GYN Annual Exam     Gynecologic Exam        Subjective     HPI  Jennifer Cosme is a 44 y.o. female, , who presents for annual well woman exam as a established patient. There were no changes to her medical or surgical history since her last visit. Patient's last menstrual period was 2025 (exact date).  Her periods occur every 27-30 days, lasting 11-14 days.  Patient reports having heavy flow for 1 day, light flow for 2-3 days, moderate for 1-2 days, and spotting for the remainder of flow. Patient states that this has been ongoing for the last 1- 2 years. Patient reports changing 1 tampon every 3-4 hours on her heaviest day of flow. She denies dysmenorrhea. Marital Status: . She is sexually active. She has not had new partners. STD testing recommendations have been explained to the patient and she declines STD testing.    The patient would like to discuss the following complaints today: Prolonged cycles    Additional OB/GYN History   contraceptive methods: Vasectomy   Desires to: do not start contraception  History of migraines: no    Last Pap : 2022. Result: negative. HPV: negative.   Last Completed Pap Smear            PAP SMEAR (Every 3 Years) Next due on 2025  LIQUID-BASED PAP SMEAR, [facility] ([facility])    09/10/2019  Done - neg, neg HPV                  History of abnormal Pap smear: no  Family history of uterine, colon, breast, or ovarian cancer: no  Performs monthly Self-Breast Exam: no  Last mammogram: 2024. Done at . There is a copy in the chart.  Last Completed Mammogram            Scheduled - MAMMOGRAM (Every 2 Years) Scheduled for 2024  Mammo Diagnostic Digital Tomosynthesis Bilateral With CAD    10/10/2023  Mammo Diagnostic Digital Tomosynthesis Bilateral With CAD    2023  Mammo Diagnostic Digital Tomosynthesis Bilateral With CAD    2023  Mammo Screening Digital  Tomosynthesis Bilateral With CAD                    Colonoscopy: has never had a colonoscopy.  Exercises Regularly: no  Feelings of Anxiety or Depression: no  Tobacco Usage?: No       Current Outpatient Medications:     famotidine (PEPCID) 20 MG tablet, Take 1 tablet by mouth 2 (Two) Times a Day., Disp: , Rfl:     ibuprofen (ADVIL,MOTRIN) 600 MG tablet, Take 1 tablet by mouth Every 6 (Six) Hours As Needed for Mild Pain ., Disp: 25 tablet, Rfl: 2    Magnesium 400 MG capsule, Take  by mouth., Disp: , Rfl:     tretinoin (RETIN-A) 0.025 % cream, Apply 1 Application topically to the appropriate area as directed Every Night., Disp: , Rfl:     Vitamin D, Ergocalciferol, 93996 units capsule, Take  by mouth Daily., Disp: , Rfl:     fluconazole (Diflucan) 150 MG tablet, Take 1 tablet by mouth Daily. Repeat dose in 3 days (Patient not taking: Reported on 2025), Disp: 2 tablet, Rfl: 2     Patient denies the need for medication refills today.    OB History          4    Para   2    Term   2       0    AB   2    Living   2         SAB   0    IAB   0    Ectopic   1    Molar        Multiple   0    Live Births   2                Past Medical History:   Diagnosis Date    Ectopic pregnancy     Female infertility     IVF pregnancy (2nd)    pap smear 09/10/2019    Pap w/ HPV regardless- negative    Polycystic ovary syndrome     per labs    Rh incompatibility         Past Surgical History:   Procedure Laterality Date     SECTION N/A 2017    Procedure:  SECTION REPEAT;  Surgeon: Anya Yanez MD;  Location: [facility] LABOR DELIVERY;  Service:      SECTION PRIMARY      EAR TUBES      EYE MUSCLE SURGERY      LAPAROSCOPIC EXPLORATION FOR ECTOPIC PREGNANCY N/A 2019    Procedure: LAPAROSCOPIC EXPLORATION FOR ECTOPIC PREGNANCY, REMOVAL OF RIGHT TUBE;  Surgeon: Anya Yanez MD;  Location: [facility] OR;  Service: Obstetrics/Gynecology       Health Maintenance   Topic Date Due    BMI FOLLOWUP   "Never done    COLORECTAL CANCER SCREENING  Never done    HEPATITIS C SCREENING  Never done    ANNUAL PHYSICAL  09/28/2022    COVID-19 Vaccine (4 - 2024-25 season) 09/01/2024    Annual Gynecologic Pelvic and Breast Exam  01/17/2025    PAP SMEAR  11/17/2025    MOST FORM  01/28/2026    MAMMOGRAM  04/24/2026    TDAP/TD VACCINES (3 - Td or Tdap) 02/09/2033    INFLUENZA VACCINE  Completed    Pneumococcal Vaccine 0-64  Aged Out       The additional following portions of the patient's history were reviewed and updated as appropriate: allergies, current medications, past family history, past medical history, past social history, past surgical history, and problem list.    Review of Systems   Constitutional: Negative.    HENT: Negative.     Eyes: Negative.    Respiratory: Negative.     Cardiovascular: Negative.    Gastrointestinal: Negative.    Endocrine: Negative.    Genitourinary:  Positive for menstrual problem.   Musculoskeletal: Negative.    Skin: Negative.    Allergic/Immunologic: Negative.    Neurological: Negative.    Hematological: Negative.    Psychiatric/Behavioral: Negative.           I have reviewed and agree with the HPI, ROS, and historical information as entered above. Anya Yanez MD          Objective   /78   Ht 170.2 cm (67\")   Wt 87.1 kg (192 lb)   LMP 01/02/2025 (Exact Date)   BMI 30.07 kg/m²     Physical Exam  Vitals and nursing note reviewed. Exam conducted with a chaperone present.   Constitutional:       Appearance: She is well-developed.   HENT:      Head: Normocephalic and atraumatic.   Neck:      Thyroid: No thyroid mass or thyromegaly.   Cardiovascular:      Rate and Rhythm: Normal rate and regular rhythm.      Heart sounds: No murmur heard.  Pulmonary:      Effort: Pulmonary effort is normal. No retractions.      Breath sounds: Normal breath sounds. No wheezing, rhonchi or rales.   Chest:      Chest wall: No mass or tenderness.   Breasts:     Right: Normal. No mass, nipple discharge, " skin change or tenderness.      Left: Normal. No mass, nipple discharge, skin change or tenderness.   Abdominal:      General: Bowel sounds are normal.      Palpations: Abdomen is soft. Abdomen is not rigid. There is no mass.      Tenderness: There is no abdominal tenderness. There is no guarding.      Hernia: No hernia is present. There is no hernia in the left inguinal area.   Genitourinary:     Labia:         Right: No rash, tenderness or lesion.         Left: No rash, tenderness or lesion.       Vagina: Normal. No vaginal discharge or lesions.      Cervix: No cervical motion tenderness, discharge, lesion or cervical bleeding.      Uterus: Normal. Not enlarged, not fixed and not tender.       Adnexa:         Right: No mass or tenderness.          Left: No mass or tenderness.        Rectum: No external hemorrhoid.   Musculoskeletal:      Cervical back: Normal range of motion. No muscular tenderness.   Neurological:      Mental Status: She is alert and oriented to person, place, and time.   Psychiatric:         Behavior: Behavior normal.            Assessment and Plan    Problem List Items Addressed This Visit    None  Visit Diagnoses       Dense breast    -  Primary    Relevant Orders    Mammo Diagnostic Digital Tomosynthesis Bilateral With CAD    Abnormal uterine bleeding (AUB)        Relevant Orders    CBC (No Diff)    US Non-ob Transvaginal    Women's annual routine gynecological examination        Encounter for breast cancer screening using non-mammogram modality                GYN annual well woman exam.   Pap guidelines reviewed.  Recommended use of Vitamin D replacement and getting adequate calcium in her diet. (1500mg)  Reviewed self breast awareness.  Instructed to call with lumps, pain, or breast discharge.    Continue yearly mammography - hopefully this is last diagnostic.  H/O dense breast.  Questions answered regarding MRI.  Reviewed HPV guidelines.  Reviewed exercise as a preventative health  measures.   AUB - will return for ultrasound and possible endometrial biopsy. Check labs today.   Return in about 2 weeks (around 2/11/2025) for US with Next Visit.     Anya Yanez MD  01/28/2025

## 2025-01-29 LAB
ERYTHROCYTE [DISTWIDTH] IN BLOOD BY AUTOMATED COUNT: 12.2 % (ref 12.3–15.4)
HCT VFR BLD AUTO: 39.8 % (ref 34–46.6)
HGB BLD-MCNC: 13.5 G/DL (ref 12–15.9)
MCH RBC QN AUTO: 29.7 PG (ref 26.6–33)
MCHC RBC AUTO-ENTMCNC: 33.9 G/DL (ref 31.5–35.7)
MCV RBC AUTO: 87.5 FL (ref 79–97)
PLATELET # BLD AUTO: 289 10*3/MM3 (ref 140–450)
RBC # BLD AUTO: 4.55 10*6/MM3 (ref 3.77–5.28)
WBC # BLD AUTO: 7.82 10*3/MM3 (ref 3.4–10.8)

## 2025-04-03 ENCOUNTER — OFFICE VISIT (OUTPATIENT)
Dept: OBSTETRICS AND GYNECOLOGY | Facility: CLINIC | Age: 45
End: 2025-04-03
Payer: COMMERCIAL

## 2025-04-03 VITALS
WEIGHT: 195 LBS | HEIGHT: 67 IN | DIASTOLIC BLOOD PRESSURE: 62 MMHG | BODY MASS INDEX: 30.61 KG/M2 | SYSTOLIC BLOOD PRESSURE: 112 MMHG

## 2025-04-03 DIAGNOSIS — N93.9 ABNORMAL UTERINE BLEEDING (AUB): Primary | ICD-10-CM

## 2025-04-03 NOTE — PROGRESS NOTES
"          Chief Complaint   Patient presents with    Follow-up         Subjective   HPI  Jennifer Cosme is a 44 y.o. female, , her last LMP was Patient's last menstrual period was 2025 (exact date). She presents for a follow up evaluation of  AUB/prolonged menses . The patient was last seen since 2025  ago by Anya Yanez MD. At that time she reported \"Her periods occur every 27-30 days, lasting 11-14 days.  Patient reports having heavy flow for 1 day, light flow for 2-3 days, moderate for 1-2 days, and spotting for the remainder of flow. Patient states that this has been ongoing for the last 1- 2 years. Patient reports changing 1 tampon every 3-4 hours on her heaviest day of flow. She denies dysmenorrhea.\". The plan was  to return for US and possible EMB . Since the last visit the patient reports the plan has remained unchanged. This has been an issue for the past 1-2 years. The patient reports additional symptoms as none.      Patient states that her flow is not heavy, but is aggravated about the prolonged bleeding. Patient states that she is undecided on an ablation since her flow is not heavy.    US was done today.       Additional OB/GYN History   Last Pap :   Last Completed Pap Smear            Upcoming       PAP SMEAR (Every 3 Years) Next due on 2025  LIQUID-BASED PAP SMEAR, [facility] ([facility])    09/10/2019  Done - neg, neg HPV                          History of abnormal Pap smear: no  Tobacco Usage?: No       Current Outpatient Medications:     famotidine (PEPCID) 20 MG tablet, Take 1 tablet by mouth 2 (Two) Times a Day., Disp: , Rfl:     ibuprofen (ADVIL,MOTRIN) 600 MG tablet, Take 1 tablet by mouth Every 6 (Six) Hours As Needed for Mild Pain ., Disp: 25 tablet, Rfl: 2    Magnesium 400 MG capsule, Take  by mouth., Disp: , Rfl:     tretinoin (RETIN-A) 0.025 % cream, Apply 1 Application topically to the appropriate area as directed Every Night., Disp: , Rfl:     " Vitamin D, Ergocalciferol, 56533 units capsule, Take  by mouth Daily., Disp: , Rfl:     fluconazole (Diflucan) 150 MG tablet, Take 1 tablet by mouth Daily. Repeat dose in 3 days (Patient not taking: Reported on 4/3/2025), Disp: 2 tablet, Rfl: 2     Past Medical History:   Diagnosis Date    Ectopic pregnancy     Female infertility     IVF pregnancy (2nd)    pap smear 09/10/2019    Pap w/ HPV regardless- negative    Polycystic ovary syndrome     per labs    Rh incompatibility         Past Surgical History:   Procedure Laterality Date     SECTION N/A 2017    Procedure:  SECTION REPEAT;  Surgeon: Anya Yanez MD;  Location:  [facility] LABOR DELIVERY;  Service:      SECTION PRIMARY      EAR TUBES      EYE MUSCLE SURGERY      LAPAROSCOPIC EXPLORATION FOR ECTOPIC PREGNANCY N/A 2019    Procedure: LAPAROSCOPIC EXPLORATION FOR ECTOPIC PREGNANCY, REMOVAL OF RIGHT TUBE;  Surgeon: Anya Yanez MD;  Location:  [facility] OR;  Service: Obstetrics/Gynecology       The additional following portions of the patient's history were reviewed and updated as appropriate: allergies, current medications, and problem list.    Review of Systems   Constitutional: Negative.    HENT: Negative.     Eyes: Negative.    Respiratory: Negative.     Cardiovascular: Negative.    Gastrointestinal: Negative.    Endocrine: Negative.    Genitourinary:  Positive for menstrual problem.   Musculoskeletal: Negative.    Skin: Negative.    Allergic/Immunologic: Negative.    Neurological: Negative.    Hematological: Negative.    Psychiatric/Behavioral: Negative.         I have reviewed and agree with the HPI, ROS, and historical information as entered above. Anya Yanez MD        Endometrial Biopsy      Indications: Jennifer Cosme is a 44 y.o. , who presents today for endometrial biopsy only (patient previously evaluated by LOS and EMB was previously recommended).  The patient was noted to have Abnormal Uterine Bleeding.   "Her LMP is Patient's last menstrual period was 03/23/2025 (exact date). After being presented with the risk, benefits, and specific detail of the procedure, the patient wished to proceed.  Written consent was obtained from patient. Urine pregnancy test was Not indicated. Patient does not have an allergy to betadine or shellfish.     Procedure Details     Time out: immediate members of the procedure team and patient agree to the following: correct patient, correct site, correct procedure to be performed. Anya Yanez MD      The patient was placed on the table in the dorsal lithotomy position.  She was draped in the appropriate manner.  A speculum was placed in the vagina.  The cervix was visualized and prepped with Betadine.  A tenaculum was placed on the anterior lip of the cervix for traction.  A small plastic 5 mm Pipelle syringe curette was inserted into the cervical canal.  The uterus was sounded to 8 cm's.  A vigorous four quadrant biopsy was performed, removing a medium amount of tissue.  The tissue was placed in Formalin and sent to Pathology.  The patient tolerated the procedure well and she reported mild cramping.  The tenaculum was removed from the cervix and the speculum was removed.  The patient was observed for 5 minutes.           Complications: none.     Procedures    Review of Systems   Constitutional: Negative.    HENT: Negative.     Eyes: Negative.    Respiratory: Negative.     Cardiovascular: Negative.    Gastrointestinal: Negative.    Endocrine: Negative.    Genitourinary:  Positive for menstrual problem.   Musculoskeletal: Negative.    Skin: Negative.    Allergic/Immunologic: Negative.    Neurological: Negative.    Hematological: Negative.    Psychiatric/Behavioral: Negative.       /62   Ht 170.2 cm (67\")   Wt 88.5 kg (195 lb)   LMP 03/23/2025 (Exact Date)   BMI 30.54 kg/m²       Plan:  No orders of the defined types were placed in this encounter.      Problem List Items Addressed " "This Visit    None  Visit Diagnoses         Abnormal uterine bleeding (AUB)    -  Primary                Instructions  Call the office in 5 business days for biopsy results.  Patient instructed to call the office if develops a fever of 100.4 or greater, vaginal bleeding heavier than a period, foul vaginal discharge or pain.     Anya Yanez MD  04/03/2025       Objective   /62   Ht 170.2 cm (67\")   Wt 88.5 kg (195 lb)   LMP 03/23/2025 (Exact Date)   BMI 30.54 kg/m²     Physical Exam  Constitutional:       Appearance: She is well-developed.   HENT:      Head: Normocephalic.   Eyes:      Conjunctiva/sclera: Conjunctivae normal.   Pulmonary:      Effort: Pulmonary effort is normal.   Psychiatric:         Behavior: Behavior normal.          Assessment & Plan     Assessment     Problem List Items Addressed This Visit    None  Visit Diagnoses         Abnormal uterine bleeding (AUB)    -  Primary              Plan     U/S reviewed and endometrial biopsy done. Discussed options from here if bx normal which include expt management, ablation or hormonal control.  F/U prn.      Anya Yanez MD  04/03/2025   "

## 2025-04-04 LAB — REF LAB TEST METHOD: NORMAL

## 2025-04-14 ENCOUNTER — TELEPHONE (OUTPATIENT)
Dept: OBSTETRICS AND GYNECOLOGY | Facility: CLINIC | Age: 45
End: 2025-04-14

## 2025-04-14 NOTE — TELEPHONE ENCOUNTER
Caller: Jennifer Cosme    Relationship: Self    Best call back number: [phone]    What is the best time to reach you: ANY    Who are you requesting to speak with (clinical staff, provider,  specific staff member): BILLING       What was the call regarding: PT STATES SHE WOULD LIKE TO SPEAK TO THE OFFICE ABOUT ABLATION BILLING COST. SPOKE WITH ESTIMATES DEPT AND THEIR ESTIMATE WAS AROUND $37,000. PT DOESN'T BELIEVES THIS TO BE CORRECT; REQUESTING A CALL TO DISCUSS.

## 2025-04-17 NOTE — TELEPHONE ENCOUNTER
Called pt and gave her estimate information. She is going to call back later in the summer to schedule.

## 2025-04-30 ENCOUNTER — HOSPITAL ENCOUNTER (OUTPATIENT)
Dept: MAMMOGRAPHY | Facility: HOSPITAL | Age: 45
Discharge: HOME | End: 2025-04-30
Admitting: RADIOLOGY
Payer: COMMERCIAL

## 2025-04-30 DIAGNOSIS — R92.8 ABNORMAL MAMMOGRAM: ICD-10-CM

## 2025-04-30 PROCEDURE — 77066 DX MAMMO INCL CAD BI: CPT | Performed by: RADIOLOGY

## 2025-04-30 PROCEDURE — 77066 DX MAMMO INCL CAD BI: CPT

## 2025-04-30 PROCEDURE — 77062 BREAST TOMOSYNTHESIS BI: CPT | Performed by: RADIOLOGY

## 2025-04-30 PROCEDURE — G0279 TOMOSYNTHESIS, MAMMO: HCPCS

## 2025-07-03 ENCOUNTER — TELEPHONE (OUTPATIENT)
Dept: OBSTETRICS AND GYNECOLOGY | Facility: CLINIC | Age: 45
End: 2025-07-03

## 2025-07-03 ENCOUNTER — OFFICE VISIT (OUTPATIENT)
Dept: OBSTETRICS AND GYNECOLOGY | Facility: CLINIC | Age: 45
End: 2025-07-03
Payer: COMMERCIAL

## 2025-07-03 ENCOUNTER — TRANSCRIBE ORDERS (OUTPATIENT)
Dept: ADMINISTRATIVE | Facility: HOSPITAL | Age: 45
End: 2025-07-03
Payer: COMMERCIAL

## 2025-07-03 VITALS
SYSTOLIC BLOOD PRESSURE: 100 MMHG | WEIGHT: 198.2 LBS | BODY MASS INDEX: 31.11 KG/M2 | HEIGHT: 67 IN | DIASTOLIC BLOOD PRESSURE: 72 MMHG

## 2025-07-03 DIAGNOSIS — Z01.818 PREOPERATIVE EXAM FOR GYNECOLOGIC SURGERY: ICD-10-CM

## 2025-07-03 DIAGNOSIS — Z12.31 SCREENING MAMMOGRAM FOR BREAST CANCER: Primary | ICD-10-CM

## 2025-07-03 DIAGNOSIS — N93.9 ABNORMAL UTERINE BLEEDING (AUB): Primary | ICD-10-CM

## 2025-07-03 RX ORDER — CALCIUM CARBONATE 500 MG/1
1 TABLET, CHEWABLE ORAL DAILY PRN
COMMUNITY

## 2025-07-03 NOTE — PROGRESS NOTES
Gynecologic Preoperative Exam Note        Subjective   Jennifer Cosme is a 45 y.o. year old  who is scheduled for hysteroscopy/D&C with Suzan at [facility] on 25 at 0800.   Her pre operative diagnosis is AUB. She does not need to see her PCP for preop clearance for this surgery. Patient's last menstrual period was 2025 (exact date).. Her birth control method is vasectomy.  Her BMI is Body mass index is 31.04 kg/m²..  Her medical history is significant for None.    She understands the risks of bleeding, infection, possible damage to other organ systems, including but not limited to the gastrointestinal tract and genitourinary tract.  She also understands the specific risks listed in the preop information.    She has reviewed and signed the preop consent form.    Her code status is: FULL     She has been instructed to have a light dinner the night before surgery, then nothing to eat or drink after midnight.  The day of surgery do not chew gum or smoke.  Remove all jewelry, nail polish, contact lenses prior to coming to the hospital.  Do not bring valuables or large sums of money with you. Patient was instructed on what time to arrive and where to check in, maps were given.  She was instructed that she will meet an Anesthesiologist and that an IV will be started to provide fluids and sedation.  The total time of procedure was discussed.  She was instructed that she will need a .      No Known Allergies  She has confirmed that she is not allergic to Latex.     She is on the following medications. These were reviewed with the patient today and instructed on which medications are ok to take with a sip of water prior to the surgery.      Current Outpatient Medications:     calcium carbonate (TUMS) 500 MG chewable tablet, Chew 1 tablet Daily As Needed for Indigestion or Heartburn., Disp: , Rfl:     famotidine (PEPCID) 20 MG tablet, Take 1 tablet by mouth 2 (Two) Times a Day., Disp: , Rfl:      ibuprofen (ADVIL,MOTRIN) 600 MG tablet, Take 1 tablet by mouth Every 6 (Six) Hours As Needed for Mild Pain ., Disp: 25 tablet, Rfl: 2    Magnesium 400 MG capsule, Take  by mouth., Disp: , Rfl:     tretinoin (RETIN-A) 0.025 % cream, Apply 1 Application topically to the appropriate area as directed Every Night., Disp: , Rfl:     Vitamin D, Ergocalciferol, 29604 units capsule, Take  by mouth Daily., Disp: , Rfl:     fluconazole (Diflucan) 150 MG tablet, Take 1 tablet by mouth Daily. Repeat dose in 3 days (Patient not taking: Reported on 2025), Disp: 2 tablet, Rfl: 2     Past Medical History:   Diagnosis Date    pap smear 09/10/2019    Pap w/ HPV regardless- negative    Ectopic pregnancy     Female infertility     IVF pregnancy (2nd)    Polycystic ovary syndrome     per labs    Rh incompatibility      Past Surgical History:   Procedure Laterality Date     SECTION N/A 2017    Procedure:  SECTION REPEAT;  Surgeon: Anya Yanez MD;  Location:  [facility] LABOR DELIVERY;  Service:     LAPAROSCOPIC EXPLORATION FOR ECTOPIC PREGNANCY N/A 2019    Procedure: LAPAROSCOPIC EXPLORATION FOR ECTOPIC PREGNANCY, REMOVAL OF RIGHT TUBE;  Surgeon: Anya Yanez MD;  Location:  [facility] OR;  Service: Obstetrics/Gynecology     SECTION PRIMARY      EAR TUBES      EYE MUSCLE SURGERY       OB History    Para Term  AB Living   4 2 2 0 2 2   SAB IAB Ectopic Molar Multiple Live Births   0 0 1 0 0 2      # Outcome Date GA Lbr Rambo/2nd Weight Sex Type Anes PTL Lv   4 Ectopic            3 Term 19 39w0d  3770 g (8 lb 5 oz) M CS-LTranv Spinal N ALEX      Name: SKY CHILD      Apgar1: 8  Apgar5: 9   2 Term 15 40w0d  3685 g (8 lb 2 oz) F CS-Unspec  N ALEX      Birth Comments: failed induction   1 AB              Social History     Tobacco Use   Smoking Status Never   Smokeless Tobacco Never     Social History     Substance and Sexual Activity   Alcohol Use Yes    Alcohol/week:  1.0 standard drink of alcohol    Types: 1 Glasses of wine per week    Comment: occasional     Social History     Substance and Sexual Activity   Drug Use No         Review of Systems   Constitutional: Negative.    HENT: Negative.     Eyes: Negative.    Respiratory: Negative.     Cardiovascular: Negative.    Gastrointestinal: Negative.    Endocrine: Negative.    Genitourinary: Negative.    Musculoskeletal: Negative.    Skin: Negative.    Allergic/Immunologic: Negative.    Neurological: Negative.    Hematological: Negative.    Psychiatric/Behavioral: Negative.        All other systems reviewed and negative.          Objective    Vitals:    07/03/25 0828   BP: 100/72         Physical Exam  Vitals and nursing note reviewed. Exam conducted with a chaperone present.   Constitutional:       Appearance: She is well-developed.   HENT:      Head: Normocephalic and atraumatic.   Cardiovascular:      Rate and Rhythm: Normal rate and regular rhythm.   Pulmonary:      Effort: Pulmonary effort is normal.      Breath sounds: Normal breath sounds.   Abdominal:      General: Bowel sounds are normal.      Palpations: Abdomen is soft. Abdomen is not rigid.   Musculoskeletal:      Cervical back: Normal range of motion. No muscular tenderness.   Skin:     General: Skin is warm and dry.   Neurological:      Mental Status: She is alert and oriented to person, place, and time.   Psychiatric:         Behavior: Behavior normal.         Assessment   Problem List Items Addressed This Visit    None  Visit Diagnoses         Abnormal uterine bleeding (AUB)    -  Primary      Preoperative exam for gynecologic surgery                         Plan   Plan is for Hysteroscopy, D&C NOvasure endometrial ablation  Risks of surgery were reviewed with the patient including risks of bleeding, infection, damage to other organ systems including, but not limited to GI and  tracts (bowel, bladder, blood vessels, nerves) risks of Anesthesia, as well as the risk  the surgery will not produce the desired results, possible need for additional surgery, death, risk of uterine perforation.  Understands increased risk in future for hysterectomy.  Doc LTUI C/S scars.        Anya Yanez MD  Visit Date: 7/3/2025

## 2025-07-03 NOTE — TELEPHONE ENCOUNTER
LVM for pt to let her know sx time has changed for Monday and she will now need to arrive at 7:00 A.M.

## 2025-07-07 ENCOUNTER — LAB REQUISITION (OUTPATIENT)
Dept: LAB | Facility: HOSPITAL | Age: 45
End: 2025-07-07
Payer: COMMERCIAL

## 2025-07-07 ENCOUNTER — OUTSIDE FACILITY SERVICE (OUTPATIENT)
Dept: OBSTETRICS AND GYNECOLOGY | Facility: CLINIC | Age: 45
End: 2025-07-07
Payer: COMMERCIAL

## 2025-07-07 DIAGNOSIS — N93.9 ABNORMAL UTERINE AND VAGINAL BLEEDING, UNSPECIFIED: ICD-10-CM

## 2025-07-07 PROCEDURE — 88305 TISSUE EXAM BY PATHOLOGIST: CPT | Performed by: OBSTETRICS & GYNECOLOGY

## 2025-07-22 ENCOUNTER — OFFICE VISIT (OUTPATIENT)
Dept: OBSTETRICS AND GYNECOLOGY | Facility: CLINIC | Age: 45
End: 2025-07-22
Payer: COMMERCIAL

## 2025-07-22 VITALS
BODY MASS INDEX: 31.11 KG/M2 | SYSTOLIC BLOOD PRESSURE: 108 MMHG | WEIGHT: 198.2 LBS | DIASTOLIC BLOOD PRESSURE: 76 MMHG | HEIGHT: 67 IN

## 2025-07-22 DIAGNOSIS — Z48.89 POSTOPERATIVE VISIT: Primary | ICD-10-CM

## 2025-07-22 DIAGNOSIS — Z98.890 STATUS POST ENDOMETRIAL ABLATION: ICD-10-CM

## 2025-07-22 PROCEDURE — 99024 POSTOP FOLLOW-UP VISIT: CPT | Performed by: OBSTETRICS & GYNECOLOGY

## 2025-07-22 NOTE — PROGRESS NOTES
OBGYN Postoperative Exam Note          Subjective   Chief Complaint   Patient presents with    Post-op Follow-up     Jennifer Cosme is a 45 y.o. year old  presenting to be seen for her post-operative visit. She is S/P diagnostic hysteroscopy, D&C, Nova[employer] endometrial ablation on 25 at [facility]. Currently she reports no problems with eating, bowel movements, voiding, or wound drainage and pain is well controlled. Has had light pink vaginal discharge since surgery.    The results were discussed with Jennifer.    OTHER THINGS SHE WANTS TO DISCUSS TODAY:  Nothing else      Current Outpatient Medications:     calcium carbonate (TUMS) 500 MG chewable tablet, Chew 1 tablet Daily As Needed for Indigestion or Heartburn., Disp: , Rfl:     famotidine (PEPCID) 20 MG tablet, Take 1 tablet by mouth 2 (Two) Times a Day., Disp: , Rfl:     ibuprofen (ADVIL,MOTRIN) 600 MG tablet, Take 1 tablet by mouth Every 6 (Six) Hours As Needed for Mild Pain ., Disp: 25 tablet, Rfl: 2    Magnesium 400 MG capsule, Take  by mouth., Disp: , Rfl:     tretinoin (RETIN-A) 0.025 % cream, Apply 1 Application topically to the appropriate area as directed Every Night., Disp: , Rfl:     Vitamin D, Ergocalciferol, 54791 units capsule, Take  by mouth Daily., Disp: , Rfl:     fluconazole (Diflucan) 150 MG tablet, Take 1 tablet by mouth Daily. Repeat dose in 3 days (Patient not taking: Reported on 2025), Disp: 2 tablet, Rfl: 2     Past Medical History:   Diagnosis Date    Ectopic pregnancy     Female infertility     IVF pregnancy (2nd)    pap smear 09/10/2019    Pap w/ HPV regardless- negative    Polycystic ovary syndrome     per labs    Rh incompatibility         Past Surgical History:   Procedure Laterality Date     SECTION N/A 2017    Procedure:  SECTION REPEAT;  Surgeon: Anya Yanez MD;  Location: [facility] LABOR DELIVERY;  Service:      SECTION PRIMARY      EAR TUBES      EYE MUSCLE  "SURGERY      LAPAROSCOPIC EXPLORATION FOR ECTOPIC PREGNANCY N/A 09/19/2019    Procedure: LAPAROSCOPIC EXPLORATION FOR ECTOPIC PREGNANCY, REMOVAL OF RIGHT TUBE;  Surgeon: Anya Yanez MD;  Location: [facility];  Service: Obstetrics/Gynecology       The following portions of the patient's history were reviewed and updated as appropriate:current medications and allergies    Review of Systems   Constitutional: Negative.    HENT: Negative.     Eyes: Negative.    Respiratory: Negative.     Cardiovascular: Negative.    Gastrointestinal: Negative.    Endocrine: Negative.    Genitourinary: Negative.    Musculoskeletal: Negative.    Skin: Negative.    Allergic/Immunologic: Negative.    Neurological: Negative.    Hematological: Negative.    Psychiatric/Behavioral: Negative.            Objective   /76   Ht 170.2 cm (67\")   Wt 89.9 kg (198 lb 3.2 oz)   LMP 06/11/2025 (Exact Date)   BMI 31.04 kg/m²     Physical Exam  Constitutional:       Appearance: She is well-developed.   HENT:      Head: Normocephalic.   Eyes:      Conjunctiva/sclera: Conjunctivae normal.   Pulmonary:      Effort: Pulmonary effort is normal.   Psychiatric:         Behavior: Behavior normal.              Assessment   S/P ablation     Plan   May return to full activity with no restrictions  Pathology was reviewed with patient  The importance of keeping all planned follow-up and taking all medications as prescribed was emphasized.  Return for Annual physical.              Anya Yanez MD  07/22/2025     "

## (undated) DEVICE — HDRST POSTIN FM CRDL TRACH SLOT 9X8X4IN

## (undated) DEVICE — ENDOPATH XCEL BLADELESS TROCARS WITH STABILITY SLEEVES: Brand: ENDOPATH XCEL

## (undated) DEVICE — ENDOPATH XCEL UNIVERSAL TROCAR STABLILITY SLEEVES: Brand: ENDOPATH XCEL

## (undated) DEVICE — ANTIBACTERIAL UNDYED BRAIDED (POLYGLACTIN 910), SYNTHETIC ABSORBABLE SUTURE: Brand: COATED VICRYL

## (undated) DEVICE — LAP PORT CLOSURE GUIDES 5MM AND 10/12MM: Brand: LAP PORT CLOSURE GUIDES 5MM AND 10/12MM

## (undated) DEVICE — COVER,LIGHT HANDLE,FLX,1/PK: Brand: MEDLINE INDUSTRIES, INC.

## (undated) DEVICE — SUT GUT CHRM 1 CTX 36IN 905H

## (undated) DEVICE — SOL IRR NACL 0.9PCT BT 1000ML

## (undated) DEVICE — 3 RING SUTURE PASSER - 16 CM: Brand: 3 RING SUTURE PASSER - 16 CM

## (undated) DEVICE — SUT GUT CHRM 2/0 CT1 27IN 811H

## (undated) DEVICE — SUT PLAIN  3/0 CT1 27IN 842H

## (undated) DEVICE — GLV SURG SIGNATURE TOUCH PF LTX 6.5 STRL BX/50

## (undated) DEVICE — SUT VIC 2/0 CT1 27IN J339H BX/36

## (undated) DEVICE — PROXIMATE RH ROTATING HEAD SKIN STAPLERS (35 WIDE) CONTAINS 35 STAINLESS STEEL STAPLES: Brand: PROXIMATE

## (undated) DEVICE — BNDG ADHS PLSTC 1X3IN LF

## (undated) DEVICE — COATED VICRYL  (POLYGLACTIN 910) SUTURE, VIOLET BRAIDED, STERILE, SYNTHETIC ABSORBABLE SUTURE: Brand: COATED VICRYL

## (undated) DEVICE — APPL CHLORAPREP W/ALC 26ML CLR

## (undated) DEVICE — 39" SINGLE PATIENT USE HOVERMATT: Brand: SINGLE PATIENT USE HOVERMATT

## (undated) DEVICE — PK LAP HYST 10

## (undated) DEVICE — ADHS LIQ MASTISOL 2/3ML

## (undated) DEVICE — GLV SURG BIOGEL LTX PF 6

## (undated) DEVICE — ENDOPOUCH RETRIEVER SPECIMEN RETRIEVAL BAGS: Brand: ENDOPOUCH RETRIEVER

## (undated) DEVICE — HARMONIC HD 1000I SHEARS, 36CM SHAFT LENGTH: Brand: HARMONIC

## (undated) DEVICE — 3M™ STERI-STRIP™ REINFORCED ADHESIVE SKIN CLOSURES, R1547, 1/2 IN X 4 IN (12 MM X 100 MM), 6 STRIPS/ENVELOPE: Brand: 3M™ STERI-STRIP™

## (undated) DEVICE — PK C/SECT 10

## (undated) DEVICE — TRY SPINE BLCK WHITACRE 25G 3X5IN

## (undated) DEVICE — SYR ART BLD GAS HEP 1CC

## (undated) DEVICE — SKIN AFFIX SURG ADHESIVE 72/CS 0.55ML: Brand: MEDLINE

## (undated) DEVICE — TROCAR: Brand: KII FIOS FIRST ENTRY

## (undated) DEVICE — SOL IRR H2O BTL 1000ML STRL

## (undated) DEVICE — NDL HYPO ECLPS SFTY 18G 1 1/2IN

## (undated) DEVICE — GLV SURG SIGNATURE TOUCH PF LTX 6 STRL

## (undated) DEVICE — SUT VIC 0 TIES 18IN J912G